# Patient Record
Sex: FEMALE | Race: ASIAN | NOT HISPANIC OR LATINO | Employment: OTHER | URBAN - METROPOLITAN AREA
[De-identification: names, ages, dates, MRNs, and addresses within clinical notes are randomized per-mention and may not be internally consistent; named-entity substitution may affect disease eponyms.]

---

## 2017-03-31 ENCOUNTER — OFFICE VISIT - HEALTHEAST (OUTPATIENT)
Dept: FAMILY MEDICINE | Facility: CLINIC | Age: 77
End: 2017-03-31

## 2017-03-31 ENCOUNTER — RECORDS - HEALTHEAST (OUTPATIENT)
Dept: GENERAL RADIOLOGY | Facility: CLINIC | Age: 77
End: 2017-03-31

## 2017-03-31 DIAGNOSIS — M54.9 DORSALGIA, UNSPECIFIED: ICD-10-CM

## 2017-03-31 DIAGNOSIS — I10 ESSENTIAL HYPERTENSION WITH GOAL BLOOD PRESSURE LESS THAN 140/90: ICD-10-CM

## 2017-03-31 DIAGNOSIS — M54.9 BACK PAIN: ICD-10-CM

## 2017-03-31 DIAGNOSIS — Z00.00 WELL ADULT EXAM: ICD-10-CM

## 2017-03-31 DIAGNOSIS — R51.9 HEADACHE: ICD-10-CM

## 2017-03-31 LAB
CHOLEST SERPL-MCNC: 248 MG/DL
FASTING STATUS PATIENT QL REPORTED: NO
HDLC SERPL-MCNC: 55 MG/DL
LDLC SERPL CALC-MCNC: 130 MG/DL
TRIGL SERPL-MCNC: 316 MG/DL

## 2017-03-31 ASSESSMENT — MIFFLIN-ST. JEOR: SCORE: 1075.77

## 2017-04-07 ENCOUNTER — RECORDS - HEALTHEAST (OUTPATIENT)
Dept: BONE DENSITY | Facility: CLINIC | Age: 77
End: 2017-04-07

## 2017-04-07 ENCOUNTER — RECORDS - HEALTHEAST (OUTPATIENT)
Dept: ADMINISTRATIVE | Facility: OTHER | Age: 77
End: 2017-04-07

## 2017-04-07 DIAGNOSIS — Z00.00 ENCOUNTER FOR GENERAL ADULT MEDICAL EXAMINATION WITHOUT ABNORMAL FINDINGS: ICD-10-CM

## 2017-04-24 ENCOUNTER — COMMUNICATION - HEALTHEAST (OUTPATIENT)
Dept: FAMILY MEDICINE | Facility: CLINIC | Age: 77
End: 2017-04-24

## 2017-04-28 ENCOUNTER — COMMUNICATION - HEALTHEAST (OUTPATIENT)
Dept: FAMILY MEDICINE | Facility: CLINIC | Age: 77
End: 2017-04-28

## 2017-06-08 ENCOUNTER — COMMUNICATION - HEALTHEAST (OUTPATIENT)
Dept: FAMILY MEDICINE | Facility: CLINIC | Age: 77
End: 2017-06-08

## 2017-06-08 DIAGNOSIS — I10 BENIGN HYPERTENSION: ICD-10-CM

## 2017-06-08 DIAGNOSIS — E78.5 HYPERLIPIDEMIA: ICD-10-CM

## 2017-06-28 ENCOUNTER — COMMUNICATION - HEALTHEAST (OUTPATIENT)
Dept: FAMILY MEDICINE | Facility: CLINIC | Age: 77
End: 2017-06-28

## 2017-06-28 DIAGNOSIS — I10 ESSENTIAL HYPERTENSION: ICD-10-CM

## 2017-08-29 ENCOUNTER — OFFICE VISIT - HEALTHEAST (OUTPATIENT)
Dept: FAMILY MEDICINE | Facility: CLINIC | Age: 77
End: 2017-08-29

## 2017-08-29 DIAGNOSIS — E78.5 HYPERLIPIDEMIA: ICD-10-CM

## 2017-08-29 DIAGNOSIS — I10 ESSENTIAL HYPERTENSION WITH GOAL BLOOD PRESSURE LESS THAN 140/90: ICD-10-CM

## 2017-08-29 DIAGNOSIS — M85.80 LOW BONE MASS: ICD-10-CM

## 2017-08-29 DIAGNOSIS — Z12.11 SCREEN FOR COLON CANCER: ICD-10-CM

## 2017-09-08 ENCOUNTER — AMBULATORY - HEALTHEAST (OUTPATIENT)
Dept: LAB | Facility: CLINIC | Age: 77
End: 2017-09-08

## 2017-09-08 ENCOUNTER — COMMUNICATION - HEALTHEAST (OUTPATIENT)
Dept: FAMILY MEDICINE | Facility: CLINIC | Age: 77
End: 2017-09-08

## 2017-09-08 DIAGNOSIS — Z12.11 SCREEN FOR COLON CANCER: ICD-10-CM

## 2017-11-29 ENCOUNTER — COMMUNICATION - HEALTHEAST (OUTPATIENT)
Dept: FAMILY MEDICINE | Facility: CLINIC | Age: 77
End: 2017-11-29

## 2017-11-29 DIAGNOSIS — I10 ESSENTIAL HYPERTENSION WITH GOAL BLOOD PRESSURE LESS THAN 140/90: ICD-10-CM

## 2018-03-30 ENCOUNTER — COMMUNICATION - HEALTHEAST (OUTPATIENT)
Dept: FAMILY MEDICINE | Facility: CLINIC | Age: 78
End: 2018-03-30

## 2018-03-30 ENCOUNTER — OFFICE VISIT - HEALTHEAST (OUTPATIENT)
Dept: FAMILY MEDICINE | Facility: CLINIC | Age: 78
End: 2018-03-30

## 2018-03-30 DIAGNOSIS — E66.9 OBESITY: ICD-10-CM

## 2018-03-30 DIAGNOSIS — E78.5 HYPERLIPIDEMIA: ICD-10-CM

## 2018-03-30 DIAGNOSIS — I10 BENIGN HYPERTENSION: ICD-10-CM

## 2018-03-30 DIAGNOSIS — I10 ESSENTIAL HYPERTENSION: ICD-10-CM

## 2018-03-30 DIAGNOSIS — M54.9 BACKACHE: ICD-10-CM

## 2018-03-30 LAB
ALBUMIN SERPL-MCNC: 3.5 G/DL (ref 3.5–5)
ALP SERPL-CCNC: 93 U/L (ref 45–120)
ALT SERPL W P-5'-P-CCNC: 30 U/L (ref 0–45)
ANION GAP SERPL CALCULATED.3IONS-SCNC: 11 MMOL/L (ref 5–18)
AST SERPL W P-5'-P-CCNC: 15 U/L (ref 0–40)
BILIRUB SERPL-MCNC: 0.6 MG/DL (ref 0–1)
BUN SERPL-MCNC: 22 MG/DL (ref 8–28)
CALCIUM SERPL-MCNC: 9.4 MG/DL (ref 8.5–10.5)
CHLORIDE BLD-SCNC: 106 MMOL/L (ref 98–107)
CO2 SERPL-SCNC: 26 MMOL/L (ref 22–31)
CREAT SERPL-MCNC: 0.66 MG/DL (ref 0.6–1.1)
GFR SERPL CREATININE-BSD FRML MDRD: >60 ML/MIN/1.73M2
GLUCOSE BLD-MCNC: 66 MG/DL (ref 70–125)
LDLC SERPL CALC-MCNC: 101 MG/DL
POTASSIUM BLD-SCNC: 3.9 MMOL/L (ref 3.5–5)
PROT SERPL-MCNC: 6.6 G/DL (ref 6–8)
SODIUM SERPL-SCNC: 143 MMOL/L (ref 136–145)

## 2018-03-30 ASSESSMENT — MIFFLIN-ST. JEOR: SCORE: 1106.38

## 2018-04-12 ENCOUNTER — COMMUNICATION - HEALTHEAST (OUTPATIENT)
Dept: FAMILY MEDICINE | Facility: CLINIC | Age: 78
End: 2018-04-12

## 2018-04-12 DIAGNOSIS — I10 ESSENTIAL HYPERTENSION: ICD-10-CM

## 2018-04-13 ENCOUNTER — OFFICE VISIT - HEALTHEAST (OUTPATIENT)
Dept: FAMILY MEDICINE | Facility: CLINIC | Age: 78
End: 2018-04-13

## 2018-04-13 DIAGNOSIS — M79.2 NEUROPATHIC PAIN: ICD-10-CM

## 2018-04-13 DIAGNOSIS — M25.659 DECREASED RANGE OF MOTION OF HIP: ICD-10-CM

## 2018-04-13 DIAGNOSIS — M85.80 LOW BONE MASS: ICD-10-CM

## 2018-04-13 DIAGNOSIS — M54.9 BACK PAIN: ICD-10-CM

## 2018-04-13 DIAGNOSIS — Z11.1 SCREENING FOR TUBERCULOSIS: ICD-10-CM

## 2018-04-13 LAB — TSH SERPL DL<=0.005 MIU/L-ACNC: 1.46 UIU/ML (ref 0.3–5)

## 2018-04-16 ENCOUNTER — COMMUNICATION - HEALTHEAST (OUTPATIENT)
Dept: FAMILY MEDICINE | Facility: CLINIC | Age: 78
End: 2018-04-16

## 2018-04-16 LAB
QTF INTERPRETATION: NORMAL
QTF MITOGEN - NIL: >10 IU/ML
QTF NIL: 0.08 IU/ML
QTF RESULT: NEGATIVE
QTF TB ANTIGEN - NIL: 0 IU/ML

## 2018-04-17 LAB — VIT B12 SERPL-MCNC: 476 PG/ML (ref 213–816)

## 2018-04-23 ENCOUNTER — HOSPITAL ENCOUNTER (OUTPATIENT)
Dept: PHYSICAL MEDICINE AND REHAB | Facility: CLINIC | Age: 78
Discharge: HOME | End: 2018-04-23
Attending: NURSE PRACTITIONER

## 2018-04-23 DIAGNOSIS — M54.42 CHRONIC BILATERAL LOW BACK PAIN WITH BILATERAL SCIATICA: ICD-10-CM

## 2018-04-23 DIAGNOSIS — M43.16 SPONDYLOLISTHESIS AT L4-L5 LEVEL: ICD-10-CM

## 2018-04-23 DIAGNOSIS — G89.29 CHRONIC BILATERAL LOW BACK PAIN WITH BILATERAL SCIATICA: ICD-10-CM

## 2018-04-23 DIAGNOSIS — M54.41 CHRONIC BILATERAL LOW BACK PAIN WITH BILATERAL SCIATICA: ICD-10-CM

## 2018-05-01 ENCOUNTER — COMMUNICATION - HEALTHEAST (OUTPATIENT)
Dept: FAMILY MEDICINE | Facility: CLINIC | Age: 78
End: 2018-05-01

## 2018-05-07 ENCOUNTER — OFFICE VISIT - HEALTHEAST (OUTPATIENT)
Dept: PHYSICAL THERAPY | Facility: REHABILITATION | Age: 78
End: 2018-05-07

## 2018-05-07 DIAGNOSIS — M54.41 CHRONIC BILATERAL LOW BACK PAIN WITH BILATERAL SCIATICA: ICD-10-CM

## 2018-05-07 DIAGNOSIS — M62.81 GENERALIZED MUSCLE WEAKNESS: ICD-10-CM

## 2018-05-07 DIAGNOSIS — M43.16 SPONDYLOLISTHESIS, LUMBAR REGION: ICD-10-CM

## 2018-05-07 DIAGNOSIS — M54.42 CHRONIC BILATERAL LOW BACK PAIN WITH BILATERAL SCIATICA: ICD-10-CM

## 2018-05-07 DIAGNOSIS — G89.29 CHRONIC BILATERAL LOW BACK PAIN WITH BILATERAL SCIATICA: ICD-10-CM

## 2018-05-08 ENCOUNTER — RECORDS - HEALTHEAST (OUTPATIENT)
Dept: ADMINISTRATIVE | Facility: OTHER | Age: 78
End: 2018-05-08

## 2018-05-16 ENCOUNTER — OFFICE VISIT - HEALTHEAST (OUTPATIENT)
Dept: PHYSICAL THERAPY | Facility: REHABILITATION | Age: 78
End: 2018-05-16

## 2018-05-16 DIAGNOSIS — M54.42 CHRONIC BILATERAL LOW BACK PAIN WITH BILATERAL SCIATICA: ICD-10-CM

## 2018-05-16 DIAGNOSIS — M54.41 CHRONIC BILATERAL LOW BACK PAIN WITH BILATERAL SCIATICA: ICD-10-CM

## 2018-05-16 DIAGNOSIS — M43.16 SPONDYLOLISTHESIS, LUMBAR REGION: ICD-10-CM

## 2018-05-16 DIAGNOSIS — M62.81 GENERALIZED MUSCLE WEAKNESS: ICD-10-CM

## 2018-05-16 DIAGNOSIS — G89.29 CHRONIC BILATERAL LOW BACK PAIN WITH BILATERAL SCIATICA: ICD-10-CM

## 2018-05-23 ENCOUNTER — OFFICE VISIT - HEALTHEAST (OUTPATIENT)
Dept: PHYSICAL THERAPY | Facility: REHABILITATION | Age: 78
End: 2018-05-23

## 2018-05-23 DIAGNOSIS — M54.41 CHRONIC BILATERAL LOW BACK PAIN WITH BILATERAL SCIATICA: ICD-10-CM

## 2018-05-23 DIAGNOSIS — M43.16 SPONDYLOLISTHESIS, LUMBAR REGION: ICD-10-CM

## 2018-05-23 DIAGNOSIS — M62.81 GENERALIZED MUSCLE WEAKNESS: ICD-10-CM

## 2018-05-23 DIAGNOSIS — G89.29 CHRONIC BILATERAL LOW BACK PAIN WITH BILATERAL SCIATICA: ICD-10-CM

## 2018-05-23 DIAGNOSIS — M54.42 CHRONIC BILATERAL LOW BACK PAIN WITH BILATERAL SCIATICA: ICD-10-CM

## 2018-09-14 ENCOUNTER — COMMUNICATION - HEALTHEAST (OUTPATIENT)
Dept: FAMILY MEDICINE | Facility: CLINIC | Age: 78
End: 2018-09-14

## 2018-09-14 DIAGNOSIS — I10 ESSENTIAL HYPERTENSION WITH GOAL BLOOD PRESSURE LESS THAN 140/90: ICD-10-CM

## 2018-09-14 DIAGNOSIS — E78.5 HYPERLIPIDEMIA: ICD-10-CM

## 2019-06-03 ENCOUNTER — COMMUNICATION - HEALTHEAST (OUTPATIENT)
Dept: FAMILY MEDICINE | Facility: CLINIC | Age: 79
End: 2019-06-03

## 2019-06-06 ENCOUNTER — RECORDS - HEALTHEAST (OUTPATIENT)
Dept: ADMINISTRATIVE | Facility: OTHER | Age: 79
End: 2019-06-06

## 2019-06-06 ENCOUNTER — AMBULATORY - HEALTHEAST (OUTPATIENT)
Dept: SCHEDULING | Facility: CLINIC | Age: 79
End: 2019-06-06

## 2019-06-06 ENCOUNTER — OFFICE VISIT - HEALTHEAST (OUTPATIENT)
Dept: FAMILY MEDICINE | Facility: CLINIC | Age: 79
End: 2019-06-06

## 2019-06-06 DIAGNOSIS — E78.5 HYPERLIPIDEMIA, UNSPECIFIED HYPERLIPIDEMIA TYPE: ICD-10-CM

## 2019-06-06 DIAGNOSIS — M54.9 BACKACHE: ICD-10-CM

## 2019-06-06 DIAGNOSIS — R20.9 DISTURBANCE OF SKIN SENSATION: ICD-10-CM

## 2019-06-06 DIAGNOSIS — I10 ESSENTIAL HYPERTENSION: ICD-10-CM

## 2019-06-06 DIAGNOSIS — Z12.11 SCREENING FOR COLON CANCER: ICD-10-CM

## 2019-06-06 DIAGNOSIS — M54.5 LOW BACK PAIN, UNSPECIFIED BACK PAIN LATERALITY, UNSPECIFIED CHRONICITY, WITH SCIATICA PRESENCE UNSPECIFIED: ICD-10-CM

## 2019-06-06 LAB
ALT SERPL W P-5'-P-CCNC: 17 U/L (ref 0–45)
ANION GAP SERPL CALCULATED.3IONS-SCNC: 9 MMOL/L (ref 5–18)
BUN SERPL-MCNC: 10 MG/DL (ref 8–28)
CALCIUM SERPL-MCNC: 9.9 MG/DL (ref 8.5–10.5)
CHLORIDE BLD-SCNC: 105 MMOL/L (ref 98–107)
CHOLEST SERPL-MCNC: 227 MG/DL
CO2 SERPL-SCNC: 27 MMOL/L (ref 22–31)
CREAT SERPL-MCNC: 0.67 MG/DL (ref 0.6–1.1)
FASTING STATUS PATIENT QL REPORTED: YES
FOLATE SERPL-MCNC: 14.3 NG/ML
GFR SERPL CREATININE-BSD FRML MDRD: >60 ML/MIN/1.73M2
GLUCOSE BLD-MCNC: 85 MG/DL (ref 70–125)
HDLC SERPL-MCNC: 59 MG/DL
LDLC SERPL CALC-MCNC: 144 MG/DL
POTASSIUM BLD-SCNC: 4.2 MMOL/L (ref 3.5–5)
SODIUM SERPL-SCNC: 141 MMOL/L (ref 136–145)
TRIGL SERPL-MCNC: 118 MG/DL
TSH SERPL DL<=0.005 MIU/L-ACNC: 1.6 UIU/ML (ref 0.3–5)
VIT B12 SERPL-MCNC: >2000 PG/ML (ref 213–816)

## 2019-06-06 ASSESSMENT — MIFFLIN-ST. JEOR: SCORE: 1107.52

## 2019-06-10 ENCOUNTER — AMBULATORY - HEALTHEAST (OUTPATIENT)
Dept: FAMILY MEDICINE | Facility: CLINIC | Age: 79
End: 2019-06-10

## 2019-06-10 DIAGNOSIS — E78.5 HYPERLIPIDEMIA, UNSPECIFIED HYPERLIPIDEMIA TYPE: ICD-10-CM

## 2019-06-18 ENCOUNTER — RECORDS - HEALTHEAST (OUTPATIENT)
Dept: ADMINISTRATIVE | Facility: OTHER | Age: 79
End: 2019-06-18

## 2019-06-18 LAB — COLOGUARD-ABSTRACT: NEGATIVE

## 2019-06-24 ENCOUNTER — AMBULATORY - HEALTHEAST (OUTPATIENT)
Dept: NURSING | Facility: CLINIC | Age: 79
End: 2019-06-24

## 2019-06-24 ENCOUNTER — COMMUNICATION - HEALTHEAST (OUTPATIENT)
Dept: FAMILY MEDICINE | Facility: CLINIC | Age: 79
End: 2019-06-24

## 2019-06-24 DIAGNOSIS — I10 ESSENTIAL HYPERTENSION: ICD-10-CM

## 2019-07-01 ENCOUNTER — OFFICE VISIT - HEALTHEAST (OUTPATIENT)
Dept: FAMILY MEDICINE | Facility: CLINIC | Age: 79
End: 2019-07-01

## 2019-07-01 DIAGNOSIS — M85.80 LOW BONE MASS: ICD-10-CM

## 2019-07-01 DIAGNOSIS — R20.9 DISTURBANCE OF SKIN SENSATION: ICD-10-CM

## 2019-07-01 ASSESSMENT — MIFFLIN-ST. JEOR: SCORE: 1090.51

## 2019-07-02 ENCOUNTER — COMMUNICATION - HEALTHEAST (OUTPATIENT)
Dept: FAMILY MEDICINE | Facility: CLINIC | Age: 79
End: 2019-07-02

## 2019-07-10 ENCOUNTER — RECORDS - HEALTHEAST (OUTPATIENT)
Dept: HEALTH INFORMATION MANAGEMENT | Facility: CLINIC | Age: 79
End: 2019-07-10

## 2019-07-17 ENCOUNTER — COMMUNICATION - HEALTHEAST (OUTPATIENT)
Dept: GERIATRIC MEDICINE | Facility: CLINIC | Age: 79
End: 2019-07-17

## 2019-08-08 ENCOUNTER — COMMUNICATION - HEALTHEAST (OUTPATIENT)
Dept: GERIATRIC MEDICINE | Facility: CLINIC | Age: 79
End: 2019-08-08

## 2019-08-22 ENCOUNTER — OFFICE VISIT - HEALTHEAST (OUTPATIENT)
Dept: FAMILY MEDICINE | Facility: CLINIC | Age: 79
End: 2019-08-22

## 2019-08-22 DIAGNOSIS — M54.50 MIDLINE LOW BACK PAIN WITHOUT SCIATICA, UNSPECIFIED CHRONICITY: ICD-10-CM

## 2019-08-22 DIAGNOSIS — I10 ESSENTIAL HYPERTENSION: ICD-10-CM

## 2019-08-22 DIAGNOSIS — M54.9 BACKACHE: ICD-10-CM

## 2019-08-22 DIAGNOSIS — J20.9 ACUTE BRONCHITIS, UNSPECIFIED ORGANISM: ICD-10-CM

## 2019-08-22 DIAGNOSIS — E78.00 PURE HYPERCHOLESTEROLEMIA: ICD-10-CM

## 2019-09-24 ENCOUNTER — RECORDS - HEALTHEAST (OUTPATIENT)
Dept: MAMMOGRAPHY | Facility: CLINIC | Age: 79
End: 2019-09-24

## 2019-09-24 ENCOUNTER — OFFICE VISIT - HEALTHEAST (OUTPATIENT)
Dept: FAMILY MEDICINE | Facility: CLINIC | Age: 79
End: 2019-09-24

## 2019-09-24 DIAGNOSIS — Z00.00 ROUTINE GENERAL MEDICAL EXAMINATION AT A HEALTH CARE FACILITY: ICD-10-CM

## 2019-09-24 DIAGNOSIS — M54.50 CHRONIC BILATERAL LOW BACK PAIN WITHOUT SCIATICA: ICD-10-CM

## 2019-09-24 DIAGNOSIS — Z12.31 ENCOUNTER FOR SCREENING MAMMOGRAM FOR BREAST CANCER: ICD-10-CM

## 2019-09-24 DIAGNOSIS — G89.29 CHRONIC BILATERAL LOW BACK PAIN WITHOUT SCIATICA: ICD-10-CM

## 2019-09-24 DIAGNOSIS — Z12.31 ENCOUNTER FOR SCREENING MAMMOGRAM FOR MALIGNANT NEOPLASM OF BREAST: ICD-10-CM

## 2019-09-24 ASSESSMENT — MIFFLIN-ST. JEOR: SCORE: 1108.65

## 2019-09-25 ENCOUNTER — COMMUNICATION - HEALTHEAST (OUTPATIENT)
Dept: FAMILY MEDICINE | Facility: CLINIC | Age: 79
End: 2019-09-25

## 2019-09-27 ENCOUNTER — COMMUNICATION - HEALTHEAST (OUTPATIENT)
Dept: FAMILY MEDICINE | Facility: CLINIC | Age: 79
End: 2019-09-27

## 2019-09-27 LAB
GAMMA INTERFERON BACKGROUND BLD IA-ACNC: 0.03 IU/ML
M TB IFN-G BLD-IMP: NEGATIVE
MITOGEN IGNF BCKGRD COR BLD-ACNC: -0.01 IU/ML
MITOGEN IGNF BCKGRD COR BLD-ACNC: 0 IU/ML
QTF INTERPRETATION: NORMAL
QTF MITOGEN - NIL: >10 IU/ML

## 2019-10-24 ENCOUNTER — COMMUNICATION - HEALTHEAST (OUTPATIENT)
Dept: FAMILY MEDICINE | Facility: CLINIC | Age: 79
End: 2019-10-24

## 2019-11-13 ENCOUNTER — COMMUNICATION - HEALTHEAST (OUTPATIENT)
Dept: GERIATRIC MEDICINE | Facility: CLINIC | Age: 79
End: 2019-11-13

## 2019-12-02 ENCOUNTER — COMMUNICATION - HEALTHEAST (OUTPATIENT)
Dept: FAMILY MEDICINE | Facility: CLINIC | Age: 79
End: 2019-12-02

## 2019-12-02 DIAGNOSIS — I10 ESSENTIAL HYPERTENSION: ICD-10-CM

## 2020-02-20 ENCOUNTER — COMMUNICATION - HEALTHEAST (OUTPATIENT)
Dept: GERIATRIC MEDICINE | Facility: CLINIC | Age: 80
End: 2020-02-20

## 2020-04-28 ENCOUNTER — COMMUNICATION - HEALTHEAST (OUTPATIENT)
Dept: GERIATRIC MEDICINE | Facility: CLINIC | Age: 80
End: 2020-04-28

## 2020-06-15 ENCOUNTER — COMMUNICATION - HEALTHEAST (OUTPATIENT)
Dept: GERIATRIC MEDICINE | Facility: CLINIC | Age: 80
End: 2020-06-15

## 2020-06-18 ENCOUNTER — COMMUNICATION - HEALTHEAST (OUTPATIENT)
Dept: FAMILY MEDICINE | Facility: CLINIC | Age: 80
End: 2020-06-18

## 2020-06-18 DIAGNOSIS — E78.5 HYPERLIPIDEMIA, UNSPECIFIED HYPERLIPIDEMIA TYPE: ICD-10-CM

## 2020-06-18 RX ORDER — ATORVASTATIN CALCIUM 40 MG/1
TABLET, FILM COATED ORAL
Qty: 90 TABLET | Refills: 3 | Status: SHIPPED | OUTPATIENT
Start: 2020-06-18 | End: 2021-07-15

## 2020-06-19 ENCOUNTER — COMMUNICATION - HEALTHEAST (OUTPATIENT)
Dept: GERIATRIC MEDICINE | Facility: CLINIC | Age: 80
End: 2020-06-19

## 2020-12-22 ENCOUNTER — COMMUNICATION - HEALTHEAST (OUTPATIENT)
Dept: FAMILY MEDICINE | Facility: CLINIC | Age: 80
End: 2020-12-22

## 2020-12-22 DIAGNOSIS — I10 ESSENTIAL HYPERTENSION: ICD-10-CM

## 2020-12-29 ENCOUNTER — OFFICE VISIT - HEALTHEAST (OUTPATIENT)
Dept: FAMILY MEDICINE | Facility: CLINIC | Age: 80
End: 2020-12-29

## 2020-12-29 DIAGNOSIS — I10 ESSENTIAL HYPERTENSION: ICD-10-CM

## 2020-12-29 RX ORDER — BLOOD PRESSURE TEST KIT
KIT MISCELLANEOUS
Qty: 1 EACH | Refills: 0 | Status: SHIPPED | OUTPATIENT
Start: 2020-12-29 | End: 2022-11-30

## 2020-12-29 ASSESSMENT — PATIENT HEALTH QUESTIONNAIRE - PHQ9: SUM OF ALL RESPONSES TO PHQ QUESTIONS 1-9: 0

## 2021-01-05 ENCOUNTER — OFFICE VISIT - HEALTHEAST (OUTPATIENT)
Dept: FAMILY MEDICINE | Facility: CLINIC | Age: 81
End: 2021-01-05

## 2021-01-05 DIAGNOSIS — I10 ESSENTIAL HYPERTENSION: ICD-10-CM

## 2021-01-05 DIAGNOSIS — Z11.59 NEED FOR HEPATITIS C SCREENING TEST: ICD-10-CM

## 2021-01-05 DIAGNOSIS — M85.80 LOW BONE MASS: ICD-10-CM

## 2021-01-05 DIAGNOSIS — E78.5 DYSLIPIDEMIA: ICD-10-CM

## 2021-01-05 DIAGNOSIS — R20.9 DISTURBANCE OF SKIN SENSATION: ICD-10-CM

## 2021-01-05 DIAGNOSIS — M54.50 CHRONIC BILATERAL LOW BACK PAIN WITHOUT SCIATICA: ICD-10-CM

## 2021-01-05 DIAGNOSIS — G89.29 CHRONIC BILATERAL LOW BACK PAIN WITHOUT SCIATICA: ICD-10-CM

## 2021-01-05 LAB
ALT SERPL W P-5'-P-CCNC: 28 U/L (ref 0–45)
ANION GAP SERPL CALCULATED.3IONS-SCNC: 13 MMOL/L (ref 5–18)
BUN SERPL-MCNC: 14 MG/DL (ref 8–28)
CALCIUM SERPL-MCNC: 9.6 MG/DL (ref 8.5–10.5)
CHLORIDE BLD-SCNC: 103 MMOL/L (ref 98–107)
CHOLEST SERPL-MCNC: 209 MG/DL
CO2 SERPL-SCNC: 25 MMOL/L (ref 22–31)
CREAT SERPL-MCNC: 0.78 MG/DL (ref 0.6–1.1)
FASTING STATUS PATIENT QL REPORTED: YES
GFR SERPL CREATININE-BSD FRML MDRD: >60 ML/MIN/1.73M2
GLUCOSE BLD-MCNC: 85 MG/DL (ref 70–125)
HDLC SERPL-MCNC: 70 MG/DL
LDLC SERPL CALC-MCNC: 110 MG/DL
POTASSIUM BLD-SCNC: 4.4 MMOL/L (ref 3.5–5)
SODIUM SERPL-SCNC: 141 MMOL/L (ref 136–145)
TRIGL SERPL-MCNC: 143 MG/DL
TSH SERPL DL<=0.005 MIU/L-ACNC: 2.54 UIU/ML (ref 0.3–5)
VIT B12 SERPL-MCNC: 526 PG/ML (ref 213–816)

## 2021-01-05 RX ORDER — HYDROCHLOROTHIAZIDE 25 MG/1
25 TABLET ORAL DAILY
Qty: 90 TABLET | Refills: 3 | Status: SHIPPED | OUTPATIENT
Start: 2021-01-05 | End: 2021-10-19

## 2021-01-05 ASSESSMENT — MIFFLIN-ST. JEOR: SCORE: 1118.86

## 2021-01-06 LAB — HCV AB SERPL QL IA: NEGATIVE

## 2021-05-26 ENCOUNTER — COMMUNICATION - HEALTHEAST (OUTPATIENT)
Dept: GERIATRIC MEDICINE | Facility: CLINIC | Age: 81
End: 2021-05-26

## 2021-05-26 ASSESSMENT — PATIENT HEALTH QUESTIONNAIRE - PHQ9: SUM OF ALL RESPONSES TO PHQ QUESTIONS 1-9: 0

## 2021-05-29 NOTE — TELEPHONE ENCOUNTER
Patient was just seen on CSS schedule today at 245 for a BP check. Her BP today is 122/82. Patient is scheduled on 7/1 @ 9:15am for an office visit with Dr. Gonzalez. Completing task.

## 2021-05-29 NOTE — TELEPHONE ENCOUNTER
[facility] pt, stated that she hasn't been seen by Dr. Gonzalez and will need a visit before he can sign paper forms. Renetta(worker) she stated that she will call back and make appt for pt.

## 2021-05-29 NOTE — TELEPHONE ENCOUNTER
----- Message from Nilson Reeves,  sent at 6/24/2019  2:49 PM CDT -----  Regarding: med  Pt called stating that the back pain med is not helping.  Pt would like for PCP to prescribe new pain med for back.  Please advise.

## 2021-05-29 NOTE — PROGRESS NOTES
"Subjective:  78 y.o. female with many concerns.    Has concerns about edema.  Trace on exam.  Discussed amlodipine could contribute.  Has not taken BP meds today or yesterday, so pressure is high, today.    Itching sensation lower extremities as well.  Nothing seems to make worse or better.  The same throughout the day and night.  Requests IV fluid to help with itching.  Has been present for a while--evaluated at spine clinic (also had pain)  PT and med employed that didn't help much for either.  Has not had imaging.  Previous B12 and TSH ok.    Says she gets rash on LEs as well.  None present today.    Outpatient Medications Prior to Visit   Medication Sig Dispense Refill     atorvastatin (LIPITOR) 20 MG tablet TAKE 1 PILL (20 MG TOTAL) BY MOUTH EVERY DAY/TXHUA HNUB NOJ 1 [facility]UB [facility] ROJ 90 tablet 2     blood pressure monitor Kit Electronic onitor blood pressure as needed. 1 each 0     calcium carbonate-vitamin D3 (CALCIUM 600 WITH VITAMIN D3) 600 mg(1,500mg) -500 unit cap Take 1 capsule by mouth 2 (two) times a day. 180 capsule 0     lisinopril (PRINIVIL,ZESTRIL) 20 MG tablet Take 1 tablet (20 mg total) by mouth daily. 90 tablet 1     VOLTAREN 1 % Gel        acetaminophen 500 mg coapsule Two capsules three times a day as needed for pain 500 capsule 1     amLODIPine (NORVASC) 5 MG tablet TAKE 1 PILL BY MOUTH DAILY FOR BLOOD PRESSURE / TXHUA HNUB NOJ 1 LUB [facility] AMANDA NTSHAV SIAB 90 tablet 2     pregabalin (LYRICA) 50 MG capsule Take 1 capsule (50 mg total) by mouth 3 (three) times a day. 90 capsule 2     No facility-administered medications prior to visit.       Social History     Tobacco Use   Smoking Status Never Smoker   Smokeless Tobacco Never Used   Tobacco Comment    no second hand smoke exposure      Objective:  /76 (Patient Site: Right Arm, Patient Position: Sitting, Cuff Size: Adult Regular)   Pulse 78   Resp 12   Ht 4' 10.5\" (1.486 m)   Wt 162 lb (73.5 kg)   BMI 33.28 " kg/m    GENERAL: alert, not distressed  CHEST: clear, no rales, rhonchi, or wheezes  CARDIAC: regular without murmur, gallop, or rub  ABDOMEN: soft, non tender, non distended, normal bowel sounds  MS: trace edema LEs.  No calf tenderness.    Assessment and Plan:   Confusing constellation of LE complains.  Stop amlodipine and start HCTZ recheck BP in 2 weeks.    1. Tingling (Paresthesia)  Perhaps neurontin would help if not better with resolution of edema  - Folate, Serum  - Thyroid Stimulating Hormone (TSH)  - Vitamin B12    2. Hyperlipidemia, unspecified hyperlipidemia type  - Lipid Cascade FASTING  - Basic Metabolic Panel  - ALT (SGPT)    3. Essential hypertension  Not controlled.  Med changes.  - hydroCHLOROthiazide (HYDRODIURIL) 25 MG tablet; Take 1 tablet (25 mg total) by mouth daily.  Dispense: 90 tablet; Refill: 1    4. Low back pain, unspecified back pain laterality, unspecified chronicity, with sciatica presence unspecified  Trial of COX2 for relief of pain.  - celecoxib (CELEBREX) 200 MG capsule; Take 1 capsule (200 mg total) by mouth 2 (two) times a day as needed for pain.  Dispense: 30 capsule; Refill: 0  - Tub Transfer Bench    5. Backache  Consider imaging--although had some hand symptoms as well.  - acetaminophen 500 mg coapsule; Two capsules three times a day as needed for pain  Dispense: 500 capsule; Refill: 1    6. Screening for colon cancer  - Cologuard   other health maintenance  - DXA Bone Density Scan; Future

## 2021-05-30 VITALS — BODY MASS INDEX: 31.25 KG/M2 | HEIGHT: 59 IN | WEIGHT: 155 LBS

## 2021-05-30 NOTE — TELEPHONE ENCOUNTER
Language line used to contact patient. Id: [id number]. Left message for patient to return call.   Okay to relay negative cologuard results. #1

## 2021-05-30 NOTE — PROGRESS NOTES
Subjective:  78 y.o. female with concerns of follow-up on multiple issues.  She states that the swelling she has felt in her legs are better with the cessation of amlodipine and start of hydrochlorothiazide.  Her blood pressure remains well controlled however she does not have lisinopril here with the rest of her medications and does not think she is taking it.    We reviewed the results of the bone scan.  Discussed osteopenia and the recommendation for weightbearing exercise, calcium, and vitamin D intake at ideal levels.  I will send a prescription for that again.    She continues to have concerns about some burning pain in her legs.  Vitamin D, B12, TSH testing of all been normal.  We discussed potentially getting an MRI to evaluate as she is had some spondylolisthesis on x-ray and has this burning pain.  She denies fevers or chills, saddle anesthesia.  Does have radiating pain to both legs.  Has been taking acetaminophen and celebrex with somewhat disappointing results.    She reports that she sent the cologard test back last week.    Outpatient Medications Prior to Visit   Medication Sig Dispense Refill     acetaminophen 500 mg coapsule Two capsules three times a day as needed for pain 500 capsule 1     atorvastatin (LIPITOR) 40 MG tablet Take 1 tablet (40 mg total) by mouth at bedtime. 90 tablet 3     blood pressure monitor Kit Electronic onitor blood pressure as needed. 1 each 0     celecoxib (CELEBREX) 200 MG capsule Take 1 capsule (200 mg total) by mouth 2 (two) times a day as needed for pain. 30 capsule 0     hydroCHLOROthiazide (HYDRODIURIL) 25 MG tablet Take 1 tablet (25 mg total) by mouth daily. 90 tablet 1     lisinopril (PRINIVIL,ZESTRIL) 20 MG tablet Take 1 tablet (20 mg total) by mouth daily. 90 tablet 1     VOLTAREN 1 % Gel        calcium carbonate-vitamin D3 (CALCIUM 600 WITH VITAMIN D3) 600 mg(1,500mg) -500 unit cap Take 1 capsule by mouth 2 (two) times a day. 180 capsule 0     No  "facility-administered medications prior to visit.       Social History     Tobacco Use   Smoking Status Never Smoker   Smokeless Tobacco Never Used   Tobacco Comment    no second hand smoke exposure      Objective:  /70 (Patient Site: Left Arm, Patient Position: Sitting, Cuff Size: Adult Regular)   Pulse 72   Resp 12   Ht 4' 10\" (1.473 m)   Wt 160 lb (72.6 kg)   BMI 33.44 kg/m    GENERAL: alert, not distressed  CHEST: clear, no rales, rhonchi, or wheezes  CARDIAC: regular without murmur, gallop, or rub  ABDOMEN: soft, non tender, non distended, normal bowel sounds  MS: negative SLR.  Normal gait.    Assessment and Plan:   1. Low bone mass  Discussed as above  - calcium carbonate-vitamin D3 (CALCIUM 600 WITH VITAMIN D3) 600 mg(1,500mg) -500 unit cap; Take 1 capsule by mouth 2 (two) times a day.  Dispense: 180 capsule; Refill: 0    2. Tingling (Paresthesia)  She declined to have MRI at this time.  Might return if needed.  Discussed trial of gabapentin for nerve modulated pain.  - gabapentin (NEURONTIN) 100 MG capsule; Take 100 mg by mouth 3 (three) times a day.  Dispense: 30 capsule; Refill: 0     Return in about 6 months (around 1/1/2020) for Recheck.   "

## 2021-05-30 NOTE — PROGRESS NOTES
[facility] Care Coordination Contact  [facility] Home Visit Assessment     Home visit for Health Risk Assessment with Yudi Fragoso completed on 7/17/2019    Type of residence:: Apartment - handicap accessible  Current living arrangement:: I live alone     Assessment completed with:: Patient, Care Team Member    Current Care Plan  Member currently receiving the following home care services:     Member currently receiving the following community resources: Day Care, Transportation Services       Medication Review  Medication reconciliation completed in Epic: YES  Medication set-up & administration: Independent-does not set up  Self-administers medications  Medication Risk Assessment Medication (1 or more, place referral to MTM):  N/A: No risk factors identified  MTM Referral Placed: No: No risk factors idenified    Mental/Behavioral Health   Depression Screening: See PHQ assessment flowsheet.   Mental health DX:: No      Mental Health Diagnosis: No  Mental Health Services: None: No further intervention needed at this time.    Falls Assessment:   Fallen 2 or more times in the past year?: No   Any fall with injury in the past year?: No    ADL/IADL Dependencies:   Dependent ADLs:: Ambulation-cane, Bathing, Eating, Toileting  Dependent IADLs:: Laundry, Cooking, Cleaning, Transportation    [facility] Plan sponsored benefits: Shared information re: Silver Sneakers/gym memberships, ASA, Calcium +D.    PCA Assessment completed at visit: No    Elderly Waiver Eligibility: Yes - will continue on EW    Care Plan & Recommendations: Per POC.    See LTCC for detailed assessment information.    Follow-Up Plan: Member informed of future contact, plan to f/u with member with a 6 month telephone assessment.  Contact information shared with member and family, encouraged member to call with any questions or concerns at any time.    [place] care continuum providers: Please refer to Health Care Home on the [facility] Problem List  to view this patient's [facility] Care Plan Summary.    Bisi Perdomo RN  [facility]  [phone]

## 2021-05-31 VITALS — BODY MASS INDEX: 31.23 KG/M2 | WEIGHT: 152 LBS

## 2021-05-31 NOTE — PROGRESS NOTES
[facility] Care Coordination Contact    Received after visit chart from care coordinator.  Completed following tasks:    Mailed copy of care plan to client, Updated services in access and Submitted referrals/auths for ADC and ADC Transportation with [facility].     Provider Signature - No POC Shared:  Member indicates that they do not want their POC shared with any EW providers.     Grabiel Noriega  Care Management Specialist  [facility]  [phone]

## 2021-05-31 NOTE — PROGRESS NOTES
One week   Cough.  Pain to side.  A lot.  Can't stop.  Mucus  No blood.  Sticky.  No shortness of breath  No fever.    Frontal headache.  No ear pain.    bp and chol med    Hyperlipidemia on statin denies muscle pain  Hypertension denies chest pain or headache.    Leg pain better.   No longer on the gabapentin.  Took prn    Back pain.better  comes and goes    No radicular         OBJECTIVE:   Vitals:    08/22/19 1128   BP: 124/68   Pulse: 78   Resp: 12   Temp: 98.5  F (36.9  C)      Eyes: non icteric, noninflamed  Lungs: no resp distress  Rhonchi   No wheeze or rales  Heart: regular  Ankles: no edema  Muscles: nontender  Mental status: euthymic  Neuro: nonfocal    Body mass index is 33.65 kg/m .     ASSESSMENT/PLAN:    1. Acute bronchitis, unspecified organism  dextromethorphan-guaiFENesin (ROBITUSSIN-DM)  mg/5 mL liquid    azithromycin (ZITHROMAX) 250 MG tablet   2. Pure hypercholesterolemia     3. Essential hypertension     4. Midline low back pain without sciatica, unspecified chronicity     5. Backache  acetaminophen 500 mg coapsule     Anticipate resolution otherwise return.  Return sooner if symptoms worsen.  Chronic issues stable/ same treatment.  Hold statin when on macrolide

## 2021-06-01 VITALS — BODY MASS INDEX: 33.08 KG/M2 | WEIGHT: 161 LBS

## 2021-06-01 VITALS — HEIGHT: 59 IN | BODY MASS INDEX: 32.61 KG/M2 | WEIGHT: 161.75 LBS

## 2021-06-01 VITALS — WEIGHT: 158 LBS | BODY MASS INDEX: 32.46 KG/M2

## 2021-06-01 NOTE — TELEPHONE ENCOUNTER
----- Message from Temo Gonzalez MD sent at 9/24/2019  6:08 PM CDT -----  Call:  Good news.  Normal mammogram.

## 2021-06-01 NOTE — TELEPHONE ENCOUNTER
Called and spoke with patient. Message was given. No other questions. Patient understood.   Used  line to contact patient. : Candy ID: [id number].

## 2021-06-01 NOTE — PROGRESS NOTES
"  Assessment and Plan:     1. Chronic bilateral low back pain without sciatica  Order faxed to [facility] medical  - Tub Transfer Bench    2. Routine general medical examination at a health care facility  Wants to go to  center so, check for TB  - Pneumococcal conjugate vaccine 13-valent 6wks-17yrs; >50yrs  - Td, Preservative Free (green label)  - QTF-Mycobacterium tuberculosis by QuantiFERON-TB Gold Plus    3. Encounter for screening mammogram for breast cancer  - Mammo Screening Bilateral; Future    The patient's current medical problems were reviewed.    I have had an Advance Directives discussion with the patient.   Full code.  Does not want to be kept alive if no meaningful recover.  Values \"peaceful life.\"  Decision maker would be her son, Sierra Fragoso.    The following health maintenance schedule was reviewed with the patient and provided in printed form in the after visit summary:   Health Maintenance   Topic Date Due     ADVANCE CARE PLANNING  08/04/1958     ZOSTER VACCINES (1 of 2) 08/04/1991     MEDICARE ANNUAL WELLNESS VISIT  08/04/2006     PNEUMOCOCCAL IMMUNIZATION 65+ LOW/MEDIUM RISK (1 of 2 - PCV13) 08/04/2006     TD 18+ HE  04/08/2018     FECAL OCCULT BLOOD/FIT ANNUAL COLON CANCER SCREENING  09/08/2018     INFLUENZA VACCINE RULE BASED (1) 08/01/2019     FALL RISK ASSESSMENT  06/06/2020     DXA SCAN  06/17/2021        Subjective:   Chief Complaint: Yudi Fragoso is an 79 y.o. female here for a Welcome to Medicare visit.     HPI:    Want to go to   Got different shape of atorvastatin 40.  Wants to go back to the old one because the new one causes stomach upset.  Advised to talk with pharmacy.    Review of Systems:    Still with some back pain.  Has declined MRI and declined referral to PT, and continues to do so.  Would like shower chair.    Please see above.  The rest of the review of systems are negative for all systems.    Patient Care Team:  Temo Gonzalez MD as PCP - General (Family " Medicine)  Bisi Perdomo RN as Lead Care Coordinator (Primary Care - CC)  Temo Gonzalez MD as Assigned PCP     Patient Active Problem List   Diagnosis     Hyperlipidemia     Myalgia And Myositis     Tingling (Paresthesia)     Essential hypertension     Disturbance Of Gait     Urinary Incontinence     Chronic bilateral low back pain without sciatica     Headache     Low bone mass     Past Medical History:   Diagnosis Date     Hyperlipidemia       History reviewed. No pertinent surgical history.   Family History   Problem Relation Age of Onset     No Medical Problems Son       Social History     Socioeconomic History     Marital status: Single     Spouse name: Not on file     Number of children: Not on file     Years of education: Not on file     Highest education level: Not on file   Occupational History     Not on file   Social Needs     Financial resource strain: Not on file     Food insecurity:     Worry: Not on file     Inability: Not on file     Transportation needs:     Medical: Not on file     Non-medical: Not on file   Tobacco Use     Smoking status: Never Smoker     Smokeless tobacco: Never Used     Tobacco comment: no second hand smoke exposure   Substance and Sexual Activity     Alcohol use: Not on file     Drug use: Not on file     Sexual activity: Not on file   Lifestyle     Physical activity:     Days per week: Not on file     Minutes per session: Not on file     Stress: Not on file   Relationships     Social connections:     Talks on phone: Not on file     Gets together: Not on file     Attends Taoist service: Not on file     Active member of club or organization: Not on file     Attends meetings of clubs or organizations: Not on file     Relationship status: Not on file     Intimate partner violence:     Fear of current or ex partner: Not on file     Emotionally abused: Not on file     Physically abused: Not on file     Forced sexual activity: Not on file   Other Topics Concern     Not on file  "  Social History Narrative     Not on file       Current Outpatient Medications   Medication Sig Dispense Refill     acetaminophen 500 mg coapsule Take 1,000 mg by mouth every 6 (six) hours as needed for pain. Two capsules three times a day as needed for pain 500 capsule 1     atorvastatin (LIPITOR) 40 MG tablet Take 1 tablet (40 mg total) by mouth at bedtime. 90 tablet 3     blood pressure monitor Kit Electronic onitor blood pressure as needed. 1 each 0     calcium carbonate-vitamin D3 (CALCIUM 600 WITH VITAMIN D3) 600 mg(1,500mg) -500 unit cap Take 1 capsule by mouth 2 (two) times a day. 180 capsule 0     celecoxib (CELEBREX) 200 MG capsule Take 1 capsule (200 mg total) by mouth 2 (two) times a day as needed for pain. 30 capsule 0     gabapentin (NEURONTIN) 100 MG capsule Take 100 mg by mouth 3 (three) times a day. 30 capsule 0     hydroCHLOROthiazide (HYDRODIURIL) 25 MG tablet Take 1 tablet (25 mg total) by mouth daily. 90 tablet 1     VOLTAREN 1 % Gel        No current facility-administered medications for this visit.       Objective:   Vital Signs:   Visit Vitals  /70 (Patient Site: Left Arm, Patient Position: Sitting, Cuff Size: Adult Regular)   Pulse 80   Resp 16   Ht 4' 10\" (1.473 m)   Wt 164 lb (74.4 kg)   BMI 34.28 kg/m       VisionScreening:  Vision Screening Comments: Left glasses at home and unable to do the vision test.        Assessment Results 9/24/2019   Mini Cog Total Score 2   Some recent data might be hidden     A Mini Cog score of 0-2 suggests the possibility of dementia, score of 3-5 suggests no dementia    Identified Health Risks:     The patient was provided with appropriate referrals to address her memory problem.    "

## 2021-06-01 NOTE — TELEPHONE ENCOUNTER
----- Message from Temo Gonzalez MD sent at 9/27/2019  2:06 PM CDT -----  Call:  Tb test normal.  Your forms will be sent to the adult center.

## 2021-06-01 NOTE — PATIENT INSTRUCTIONS - HE
Patient Education   Personalized Prevention Plan  You are due for the preventive services outlined below.  Your care team is available to assist you in scheduling these services.  If you have already completed any of these items, please share that information with your care team to update in your medical record.  Health Maintenance   Topic Date Due     ADVANCE CARE PLANNING  08/04/1958     ZOSTER VACCINES (1 of 2) 08/04/1991     MEDICARE ANNUAL WELLNESS VISIT  08/04/2006     PNEUMOCOCCAL IMMUNIZATION 65+ LOW/MEDIUM RISK (1 of 2 - PCV13) 08/04/2006     TD 18+ HE  04/08/2018     FECAL OCCULT BLOOD/FIT ANNUAL COLON CANCER SCREENING  09/08/2018     INFLUENZA VACCINE RULE BASED (1) 08/01/2019     FALL RISK ASSESSMENT  06/06/2020     DXA SCAN  06/17/2021

## 2021-06-02 NOTE — TELEPHONE ENCOUNTER
Spoke with [facility] [phone]. They have been trying to get a hold of pt by phone without success. I informed [facility] to call pt with a [facility] . They also needed her weight and height which I provided them that information. Called and left message for pt to call back.

## 2021-06-02 NOTE — TELEPHONE ENCOUNTER
----- Message from Nilson Reeves,  sent at 10/23/2019  3:35 PM CDT -----  Regarding: shower bench  Pt called to following on shower bench.  Per pt, she has been waiting for some one to calls her to  a shower bench, but no one calls yet.  Please advise.

## 2021-06-02 NOTE — TELEPHONE ENCOUNTER
Informed pt's daughter to help call [facility] [phone] regarding this order.

## 2021-06-03 VITALS
DIASTOLIC BLOOD PRESSURE: 70 MMHG | SYSTOLIC BLOOD PRESSURE: 130 MMHG | RESPIRATION RATE: 16 BRPM | WEIGHT: 164 LBS | HEIGHT: 58 IN | HEART RATE: 80 BPM | BODY MASS INDEX: 34.43 KG/M2

## 2021-06-03 VITALS — BODY MASS INDEX: 33.65 KG/M2 | WEIGHT: 161 LBS

## 2021-06-03 VITALS — BODY MASS INDEX: 33.58 KG/M2 | HEIGHT: 58 IN | WEIGHT: 160 LBS

## 2021-06-03 VITALS — WEIGHT: 162 LBS | HEIGHT: 59 IN | BODY MASS INDEX: 32.66 KG/M2

## 2021-06-03 NOTE — TELEPHONE ENCOUNTER
Noted, pt seen Refill Approved    Rx renewed per Medication Renewal Policy. Medication was last renewed on 6/6/19.    Kary Lowe, [facility] Triage/Med Refill 12/2/2019     Requested Prescriptions   Pending Prescriptions Disp Refills     hydroCHLOROthiazide (HYDRODIURIL) 25 MG tablet [Pharmacy Med Name: HYDROCHLOROTHIAZIDE 25 MG T 25 TAB] 90 tablet 1     Sig: TAKE 1 TABLET (25 MG TOTAL) BY MOUTH DAILY/ TXHUA HNUB NOJ 1 [facility]UB [facility]       Diuretics/Combination Diuretics Refill Protocol  Passed - 12/2/2019 10:25 AM        Passed - Visit with PCP or prescribing provider visit in past 12 months     Last office visit with prescriber/PCP: 7/1/2019 Temo Gonzalez MD OR same dept: 8/22/2019 Gabino Arzate MD OR same specialty: 8/22/2019 Gabino Arzate MD  Last physical: 9/24/2019 Last MTM visit: Visit date not found   Next visit within 3 mo: Visit date not found  Next physical within 3 mo: Visit date not found  Prescriber OR PCP: Temo Gonzalez MD  Last diagnosis associated with med order: 1. Essential hypertension  - hydroCHLOROthiazide (HYDRODIURIL) 25 MG tablet [Pharmacy Med Name: HYDROCHLOROTHIAZIDE 25 MG T 25 TAB]; TAKE 1 TABLET (25 MG TOTAL) BY MOUTH DAILY/ TX[facility]UA [facility]NUB NOJ [facility] NTSHAV SIAB  Dispense: 90 tablet; Refill: 1    If protocol passes may refill for 12 months if within 3 months of last provider visit (or a total of 15 months).             Passed - Serum Potassium in past 12 months      Lab Results   Component Value Date    Potassium 4.2 06/06/2019             Passed - Serum Sodium in past 12 months      Lab Results   Component Value Date    Sodium 141 06/06/2019             Passed - Blood pressure on file in past 12 months     BP Readings from Last 1 Encounters:   09/24/19 130/70             Passed - Serum Creatinine in past 12 months      Creatinine   Date Value Ref Range Status   06/06/2019 0.67 0.60 - 1.10 mg/dL Final

## 2021-06-03 NOTE — PROGRESS NOTES
"[facility] Care Coordination Contact    Writer got a call from member asking why her insurance did not cover a vaccination.  Member didn't know what the vaccination was called but she mentioned \"chickenpox\" during conversation.  Writer is assuming that member was referring to the shingles vaccination.       Writer emailed and asked Grabiel Fragoso CMA at [facility] if there is a particular pharmacy that they'd recommend for member to go to for the shingles vaccination.    Bisi Perdomo RN  [facility]  [phone]     11-13-19:  Writer received response from [facility] [place] and was told that member can go to any pharmacy for the shingles vaccine.  Writer called member and left a VM stating what Grabiel told writer.      Bisi Perdomo RN  [facility]  [phone]     "

## 2021-06-05 VITALS
TEMPERATURE: 96.8 F | WEIGHT: 162.75 LBS | SYSTOLIC BLOOD PRESSURE: 130 MMHG | BODY MASS INDEX: 32.81 KG/M2 | HEART RATE: 78 BPM | HEIGHT: 59 IN | DIASTOLIC BLOOD PRESSURE: 80 MMHG

## 2021-06-06 NOTE — PROGRESS NOTES
[facility] Care Coordination Contact    2-:  Called member to complete six month assessment and left a message requesting a return call.    Bisi Perdomo RN  [facility]  [phone]     2-:  Called member to complete six month assessment and left a message requesting a return call.      [facility] Six-Month Telephone Assessment    6 month telephone assessment completed on 2-.    ER visits: No  Hospitalizations: No  TCU stays: No  Significant health status changes: no  Falls/Injuries: No  ADL/IADL changes: No  Changes in services: No    Caregiver Assessment follow up:  n/q    Goals: See POC in chart for goal progress documentation.       Will see member in 6 months for an annual health risk assessment.   Encouraged member to call CC with any questions or concerns in the meantime.    Writer asked member about MA renewal paperwork and member reports that she already sent in paperwork last month but was told by the [facility], yesterday, that they had more paperwork for her to fill out.  She completed the paperwork yesterday and the ADC sent the paperwork to the financial worker at [facility].    Bisi Perdomo RN  [facility]  [phone]        3-: Writer called member and asked about MA again.  Member informed writer that she has submitted all the necessary paperwork already but is still receiving letters stating there her MA will end on 3-.  Writer suggested member call [facility] financial worker as soon as able.  Member stated understanding and will have her daughter in law call.      Bisi Perdomo RN  [facility]  [phone]

## 2021-06-07 NOTE — PROGRESS NOTES
[facility] Care Coordination Contact    Writer received call from member's financial worker at [facility] (Ka [phone]) asking if member's EW has been closed by writer because [facility] closed member's MA since 1-1-2020.  Writer told Ka that writer has not closed member's EW.  Ka stated that since writer has not closed member's EW, member will not need a new assessment if member can provide a completed AVS form along with a checking and saving account balances for 1-1-2020 and 4-1-2020.  Ka mailed out the requested paperwork today.      Writer called member and left a voicemail asking for a return call.  Writer also called member's son, Sierra Fragoso ([phone]) and left a voicemail asking for him to have member call writer.  Writer to explain what Ka stated to writer.    Awaiting call from member.    Bisi Perdomo RN  [facility]  [phone]         4-: member returned call to writer.  Writer explained Ka's message above.  Member stated understanding and said that member's daugter in law talked to someone from [facility] the other day but member was unsure if it was Lucas.  Member also asked for Ka's contact info and said that she would call Ka for more details as well.      Bisi Perdomo RN  [facility]  [phone]       6-:  Writer received call from Bebo Alfonso ( Intake [phone]) asking for member's transfer documents.    Writer emailed secure: 1932, 7400, LTCC, POC, and POCsig to [email or web address] at Bebo's request.    Bisi Perdomo RN  [facility]  [phone]

## 2021-06-08 NOTE — PROGRESS NOTES
Member became effective with [employer] [facility]  Previous Health Plan: MA   Previous Care System: [facility]  Previous care coordinators name and number: Bisi Perdomo/[facility]  Waiver Type: N/A  Last MMIS Entry: Date 7/17/19 and Type 06.  MMIS visit date (and type) if different from above: n/a  Services Listed in MMIS: None  UTF received: No: Requested on 6/12/20 From [facility].     Grabiel Noriega  Care Management Specialist  [facility]  [phone]

## 2021-06-08 NOTE — PROGRESS NOTES
[facility] responded no UTF docs. CMS Requested through [facility], [facility] states members don t have EW or UTF docs to transfer.     Grabiel Noriega  Care Management Specialist  [facility]  [phone]

## 2021-06-09 NOTE — PROGRESS NOTES
"Per CC, mailed client an \"Unable to Contact\" letter.    Grabiel Noriega  Care Management Specialist  [facility]  [phone]    "

## 2021-06-09 NOTE — PROGRESS NOTES
[facility] Care Coordination Contact    Writer called member and left a voicemail asking for a return call.  Writer to do phone assessment with member once call is returned.      Bisi Perdomo RN  [facility]  [phone]       6-:  Writer called member and left a voicemail asking for a return call.  Writer to do phone assessment with member once call is returned.      Bisi Perdomo RN  [facility]  [phone]       6-:  Writer called member and left a voicemail asking for a return call.  Writer to do phone assessment with member once call is returned.      Unable to reach.    Bisi Perdomo RN  [facility]  [phone]

## 2021-06-09 NOTE — PROGRESS NOTES
No longer active with [facility] community case management effective 3/31/20. Reason for community disenrollment: EDVIN Rich.    Grabiel Noriega  Care Management Specialist  [facility]  [phone]

## 2021-06-09 NOTE — PROGRESS NOTES
"Adult Physical:    CC:  phsycial and other complaints.    HPI:  Has occasional headache and wants ibuprofen refilled for that.  Feels like muscle tension is present.  Does not feel like \"bone pain.  Has something every 2-3 days.    Back pain is been present and on and off for 1 year.  Located in the lower bilateral lumbar sacral area.  She denies radiation to her legs.  She reports legs feel \"hot\".  Particularly in the summer.  Feels better if she is wearing shorts.  She has used NSAIDs in the past but has not had any manipulative therapy or physical therapy.    Patient Active Problem List   Diagnosis     Hyperlipidemia     Myalgia And Myositis     Tingling (Paresthesia)     Essential hypertension     Disturbance Of Gait     Urinary Incontinence     Backache     Headache     Past Medical History:  History reviewed. No pertinent past medical history.    Past Surgical History:  History reviewed. No pertinent surgical history.    Medicines:   Yudi Fragoso   Home Medication Instructions DARRELL:    Printed on:03/31/17 0811   Medication Information                      acetaminophen (TYLENOL) 500 MG tablet  Take 1-2 pills up to 4 times a day prn for headaches.             blood pressure monitor Kit  Electronic onitor blood pressure as needed.             hydrochlorothiazide (MICROZIDE) 12.5 mg capsule  TAKE 1 CAPSULE (12.5 MG TOTAL) BY MOUTH DAILY/ TXHUA HNUB NOJ 1 LUB TSHUAJ PAB AMANDA NTSHAV SIAB             ibuprofen (ADVIL,MOTRIN) 400 MG tablet  Take 400 mg by mouth every 6 (six) hours as needed for pain.             lisinopril (PRINIVIL,ZESTRIL) 20 MG tablet  TAKE 1 PILL (20 MG TOTAL) BY MOUTH DAILY/ TXHUA HNUB NOJ 1 LUB TSHUAJ PAB AMANDA NTSHAV SIAB             simvastatin (ZOCOR) 20 MG tablet  Take 1 tablet (20 mg total) by mouth daily.             VOLTAREN 1 % Gel                 Allergies:  No Known Allergies    Family History:  Family History   Problem Relation Age of Onset     No Medical Problems Son      Social " "History:  Lives with son.  No tobacco or alcohol.    Review of Systems:  GEN: no fevers or chills   ENT: no sinus concerns, normal hearing and vision   RESP: no cough or wheezing   CV: no dyspnea, palpitations, edema or chest pain   GI: no nausea, vomiting, diarrhea, or constipation   : normal urination   ENDO: no hot or cold intolerance, no polydipsia or polyuria   MS: no myalgias or arthralgias   DERM: no rash or bruising   PSYCH: no depression concerns     Physical Exam:  /64 (Patient Site: Right Arm, Patient Position: Sitting, Cuff Size: Adult Regular)  Pulse 72  Temp 97.6  F (36.4  C) (Oral)   Ht 4' 10.5\" (1.486 m)  Wt 155 lb (70.3 kg)  Breastfeeding? No  BMI 31.84 kg/m2     Gen:   Alert, not distressed  Head:   Normocephalic, without obvious abnormality, atraumatic  Eyes:   PERRL, conjunctiva/corneas clear, EOM's intact  Ears:   Normal tympanic membranes and external ear canals  Nose:    Mucosa normal, no drainage or sinus tenderness  Throat:    No erythema or exudates  Neck:    No adenopathy no nodules in thyroid, normal ROM  Lungs:    Clear to auscultation bilaterally, respirations unlabored  Chest wall:  No tenderness or deformity  Heart:     Regular, normal S1 and S2, no murmur, gallop or rub  Abdomen:  Soft, non-tender, bowel sounds active all four quadrants, no masses, no organomegaly  Back:    Symmetric, no curvature, ROM normal, no CVA tenderness  Extremities: Extremities normal, atraumatic, no cyanosis or edema  Skin:   Skin color, texture, turgor normal, no rashes or lesions  Lymph nodes: Cervical, and supraclavicular, nodes normal  Neurologic: CNII-XII intact.   DTRs normal and symmetric.  Symmetric strength and sensation.  Straight leg raise test negative bilaterally.    Lumbar spine x-ray:  Personally reviewed.  Nothing to explain pain    Immunizations Due:  PCV:  PPSV23 ordered  FLU: patient decliens    Tobacco Cessation:   n/a    Cancer Screening:  Colon: FOBT done in " August  Breast: discussed.  Patient declined mammogram.    Weight management:   The patient was counseled regarding nutrition and physical activity    Assessment and Plan:  1. Back pain  She can continue judicious use of NSAIDs given her kidney function.  There is any decline we may need to revisit that.  - ibuprofen (ADVIL,MOTRIN) 400 MG tablet; Take 1 tablet (400 mg total) by mouth every 6 (six) hours as needed for pain.  Dispense: 30 tablet; Refill: 0    2. Well adult exam  - Pneumococcal polysaccharide vaccine 23-valent 1 yo or older, subq/IM  - DXA Bone Density Scan; Future    3. Headache  - ibuprofen (ADVIL,MOTRIN) 400 MG tablet; Take 1 tablet (400 mg total) by mouth every 6 (six) hours as needed for pain.  Dispense: 30 tablet; Refill: 0    4. Essential hypertension with goal blood pressure less than 140/90  - Basic Metabolic Panel  - Lipid Cascade RANDOM

## 2021-06-11 ENCOUNTER — COMMUNICATION - HEALTHEAST (OUTPATIENT)
Dept: GERIATRIC MEDICINE | Facility: CLINIC | Age: 81
End: 2021-06-11

## 2021-06-12 NOTE — PROGRESS NOTES
Subjective:  77 y.o. female with concerns of needing a form filled out for public housing.  She wants to live below the sixth floor and a public housing facility because of her fear of heights.  She states she gets care and cannot move if she is up that high.  She thinks a few stems from [place]  She also reports relates being fearful of fires and fearful of her ability to descend stairs quickly if needed due to a fire.  She states she would lose sleep worrying about this potential occurrence.  We discussed that did not seem to be a medical diagnosis that would fit with that condition but I could fill out her public housing form with the information she provided.  Ultimately, the decision will be with the public Housing Authority about whether or not her concerns qualified.    She continues to have headaches.  She estimates 2-3 times per week.  She states she will take some medication though she is not sure what it is.  She thinks her headaches will stay away for a few days after that.  They then recur.    Outpatient Medications Prior to Visit   Medication Sig Dispense Refill     acetaminophen (TYLENOL) 500 MG tablet Take 1-2 pills up to 4 times a day prn for headaches.       blood pressure monitor Kit Electronic onitor blood pressure as needed. 1 each 0     ibuprofen (ADVIL,MOTRIN) 400 MG tablet Take 1 tablet (400 mg total) by mouth every 6 (six) hours as needed for pain. 30 tablet 0     lisinopril (PRINIVIL,ZESTRIL) 20 MG tablet TAKE 1 PILL (20 MG TOTAL) BY MOUTH DAILY/ TXHUA HNUB NOJ 1 LUB TSHUAJ PAB AMANDA NTSHAV SIAB 90 tablet 1     VOLTAREN 1 % Gel        hydroCHLOROthiazide (MICROZIDE) 12.5 mg capsule TAKE 1 CAPSULE (12.5 MG TOTAL) BY MOUTH DAILY/ TXHUA HNUB NOJ 1 LUB TSHUAJ PAB AMANDA NTSHAV SIAB 90 capsule 2     simvastatin (ZOCOR) 20 MG tablet TAKE 1 PILL BY MOUTH EVERY DAY/ TXHUA HNUB NOJ 1 LUB TSHUAJ PAB AMANDA NTSHAV MUAJ ROJ 90 tablet 3     No facility-administered  medications prior to visit.       History   Smoking Status     Never Smoker   Smokeless Tobacco     Not on file     Comment: no second hand smoke exposure      Objective:  /70 (Patient Site: Right Arm, Patient Position: Sitting, Cuff Size: Adult Regular)  Pulse 92  Wt 152 lb (68.9 kg)  Breastfeeding? No  BMI 31.23 kg/m2  GENERAL: alert, not distressed  CHEST: clear, no rales, rhonchi, or wheezes  CARDIAC: regular without murmur    EYES: PERRL/EOMI, no scleral icterus, no conjunctival injection  Cranial Nerves:  II-normal visual fields  III, IV, VI-normal extraocular movements  V-normal facial sensation  VII-normal facial power  VIII-hearing normal at conversational level  IX, X-palate/uvula symmetric  XI-shoulder shrug antigravity  XII-tongue midline     Assessment and Plan:   1. Headaches  Recommended changing to a calcium channel blocker for blood pressure control.  Since her pressures fairly well controlled would recommend stopping the hydrochlorothiazide.  Due to interactions with simvastatin I would recommend that the cholesterol-lowering drug be changed to atorvastatin as well.  Continues to treat headaches with a as needed NSAID or acetaminophen.    2. Essential hypertension with goal blood pressure less than 140/90  Stop HCTZ.  Continue lisinopril  - amLODIPine (NORVASC) 5 MG tablet; Take 1 tablet (5 mg total) by mouth daily.  Dispense: 90 tablet; Refill: 0    3. Hyperlipidemia  Stop simvastatin.  - atorvastatin (LIPITOR) 20 MG tablet; Take 1 tablet (20 mg total) by mouth daily.  Dispense: 90 tablet; Refill: 3    4. Low bone mass  Discussed results of DXA scan.  - calcium carbonate-vitamin D3 (CALCIUM 600 WITH VITAMIN D3) 600 mg(1,500mg) -500 unit cap; Take 1 capsule by mouth 2 (two) times a day.  Dispense: 180 capsule; Refill: 0     5.Screen for colon cancer  - Occult Blood(ICT); Future    This visit was conducted with the aid of a professional .

## 2021-06-14 NOTE — PROGRESS NOTES
"Subjective:  80 y.o. female with concerns of follow up.  Needs bp recheck  Could not get meter for home  ? No insurance coverage.  Taking HCTZ no concerns.    Hx osteopenia.  Not taking Ca  DXA 2019 due 2021  Does exercises.    Feet burn, leg tingle.  Feel hot inside, but not tactile.  Not pain.  Nothing makes worse or better.  Over 7 years the same.  Mild LBP.  No fever or chills.  No hx CA  No new incontinence  No saddle numbness    Outpatient Medications Prior to Visit   Medication Sig Dispense Refill     atorvastatin (LIPITOR) 40 MG tablet TAKE 1 PILL BY MOUTH EVERY NIGHT AT BEDTIME FOR CHOLESTEROL / TXHUA HMO NOJ 1 LUB TSHUAJ THAUM MUS PW PAB NTSHAV MUAJ ROJ 90 tablet 3     hydroCHLOROthiazide (HYDRODIURIL) 25 MG tablet Take 1 tablet (25 mg total) by mouth daily. 30 tablet 0     blood pressure monitor Kit Disp 1 brachial BP meter with digital readout. 1 each 0     acetaminophen 500 mg coapsule Take 1,000 mg by mouth every 6 (six) hours as needed for pain. Two capsules three times a day as needed for pain 500 capsule 1     blood pressure monitor Kit Electronic onitor blood pressure as needed. 1 each 0     calcium carbonate-vitamin D3 (CALCIUM 600 WITH VITAMIN D3) 600 mg(1,500mg) -500 unit cap Take 1 capsule by mouth 2 (two) times a day. 180 capsule 0     No facility-administered medications prior to visit.       Social History     Tobacco Use   Smoking Status Never Smoker   Smokeless Tobacco Never Used   Tobacco Comment    no second hand smoke exposure      Objective:  /80 (Patient Site: Left Arm, Patient Position: Sitting, Cuff Size: Adult Regular)   Pulse 78   Temp 96.8  F (36  C) (Other)   Ht 4' 11\" (1.499 m)   Wt 162 lb 12 oz (73.8 kg)   BMI 32.87 kg/m    GENERAL: alert, not distressed  CHEST: clear, no rales, rhonchi, or wheezes  CARDIAC: regular without murmur, gallop, or rub  ABDOMEN: soft, non tender, non distended, normal bowel sounds  NEURO: SLR negative, normal gait, strength  MS: normal " muscle tone, bulk.    Assessment and Plan:   1. Essential hypertension  Controlled on third check here.  Check labs  Continue HCTZ  - Basic Metabolic Panel  - hydroCHLOROthiazide (HYDRODIURIL) 25 MG tablet; Take 1 tablet (25 mg total) by mouth daily.  Dispense: 90 tablet; Refill: 3    2. Tingling (Paresthesia)  Recheck for causes of peripheral neuropathy  - Thyroid Cascade  - Vitamin B12    3. Dyslipidemia  Needs monitoring.  - Lipid Cascade FASTING  - ALT (SGPT)    4. Need for hepatitis C screening test  - Hepatitis C Antibody (Anti-HCV)    5. Chronic bilateral low back pain without sciatica  Mild and stable with no warning signs.  PT considered but patient wants to avoid visits during pandemic.  Has a home exercise routine.    Encouraged to keep it up.  - acetaminophen 500 mg coapsule; Take 1,000 mg by mouth every 6 (six) hours as needed for pain. Two capsules three times a day as needed for pain  Dispense: 500 capsule; Refill: 1    7. Low bone mass  Continue exercise as she has been,,  Restart Ca and D supplement  - calcium carbonate-vitamin D3 (CALCIUM 600 WITH VITAMIN D3) 600 mg(1,500mg) -500 unit cap; Take 1 capsule by mouth 2 (two) times a day.  Dispense: 180 capsule; Refill: 0  - multivitamin (ONE A DAY) per tablet; Take 1 tablet by mouth daily.  Dispense: 120 tablet; Refill: 2

## 2021-06-14 NOTE — TELEPHONE ENCOUNTER
Patient has a future Telephone Visit appointment on 12/29/2020 with Dr. Gonzalez. Completing task.

## 2021-06-14 NOTE — PROGRESS NOTES
"Yudi Fragoso is a 80 y.o. female who is being evaluated via a billable telephone visit.      The patient has been notified of following:     \"This telephone visit will be conducted via a call between you and your physician/provider. We have found that certain health care needs can be provided without the need for a physical exam.  This service lets us provide the care you need with a short phone conversation.  If a prescription is necessary we can send it directly to your pharmacy.  If lab work is needed we can place an order for that and you can then stop by our lab to have the test done at a later time.    Telephone visits are billed at different rates depending on your insurance coverage. During this emergency period, for some insurers they may be billed the same as an in-person visit.  Please reach out to your insurance provider with any questions.    If during the course of the call the physician/provider feels a telephone visit is not appropriate, you will not be charged for this service.\"    Patient has given verbal consent to a Telephone visit? Yes    What phone number would you like to be contacted at? [phone]    Patient would like to receive their AVS by AVS Preference: Mail a copy.    Additional provider notes:   Wants to have F2F visit rather than virtual.  Does not have BP cuff at home  Needs labs.  Will call to schedule appt.  Long discussion about meds and blood pressure and how to get clinic appt.    Assessment/Plan:  1. Essential hypertension  - blood pressure monitor Kit; Disp 1 brachial BP meter with digital readout.  Dispense: 1 each; Refill: 0    Phone call duration:  16 minutes    Temo Gonzalez MD      "

## 2021-06-16 PROBLEM — M85.80 LOW BONE MASS: Status: ACTIVE | Noted: 2017-08-29

## 2021-06-17 NOTE — PROGRESS NOTES
ASSESSMENT: Yudi Fragoso is a 77 y.o. female who presents for consultation at the request of HE PCP Temo Gonzalez MD, with a past medical history significant for hyperlipidemia, myalgia and myositis, paresthesias, hypertension, urinary incontinence, low bone mass, back pain, headache who presents today for new patient evaluation of:    -Chronic progressive bilateral low back pain lumbosacral junction that radiates into the bilateral buttock as well as lateral thigh with some burning pain into the anterior shin ×2 years that is worse in the last 5 months.  No prior conservative treatment.  Lumbar spine x-ray 2017 reveals L4-5 spondylolisthesis and mild degenerative changes.    Patient is neurologically intact on exam. No myelopathic or red flag symptoms.     ASIM Score: 38    WHO-5: 25, patient not interested in behavioral health services.    PHQ-2: 0    Diagnoses and all orders for this visit:    Spondylolisthesis at L4-L5 level  -     Ambulatory referral to PT/OT  -     naproxen (EC NAPROSYN) 500 MG EC tablet; Take 1 tablet (500 mg total) by mouth 2 (two) times a day as needed.  Dispense: 28 tablet; Refill: 0    Chronic bilateral low back pain with bilateral sciatica  -     Ambulatory referral to PT/OT  -     naproxen (EC NAPROSYN) 500 MG EC tablet; Take 1 tablet (500 mg total) by mouth 2 (two) times a day as needed.  Dispense: 28 tablet; Refill: 0    PLAN:  Reviewed spine anatomy and disease process. Discussed diagnosis and treatment options with the patient today. A shared decision making model was used.  The patient's values and choices were respected. The following represents what was discussed and decided upon by the provider and the patient.      -DIAGNOSTIC TESTS:  Images were personally reviewed and interpreted and explained to patient today using spine model.   --Discussed with patient if she is not improving with physical therapy or not tolerating PT exercises would recommend lumbar spine MRI at that  time however as she is neurologically intact on exam we can trial conservative treatments first.  --Lumbar spine x-ray 3/31/2017 shows slight anterolisthesis L4-5 with facet arthropathy L4 to the sacrum. *S1 partially lumbarized.    -PHYSICAL THERAPY: Referral to physical therapy [facility] for core strengthening nerve glide exercises and establishing home exercise program.  Discussed the importance of core strengthening, ROM, stretching exercises with the patient and how each of these entities is important in decreasing pain.  Explained to the patient that the purpose of physical therapy is to teach the patient a home exercise program.  These exercises need to be performed every day in order to decrease pain and prevent future occurrences of pain.        -MEDICATIONS: Advised patient continue Lyrica and Tylenol prescribe a PCP.  -Prescribed naproxen 500 mg 1 tablet twice daily for pain and inflammation ×2 weeks, did discuss with patient this is not a medication I would recommend long-term however.  Discussed multiple medication options today with patient. Discussed risks, side effects, and proper use of medications. Patient verbalized understanding.    -INTERVENTIONS: Can consider injections down the road if pain is not improving with physical therapy pending MRI.  *Partially lumbarized S1 noted on x-ray 3/31/2017.  Discussed risks and benefits of injections with patient today.    -PATIENT EDUCATION:  45 minutes of total visit time was spent face to face with the patient today, greater than 50% of total time spent with patient was spent on counseling, education, and coordinating care.   -10 minutes spent outside of visit time, non-face-to-face time, reviewing chart.    -FOLLOW-UP:   Follow-up in 6 weeks, sooner pain is worsening or new symptoms arise.    Advised patient to call the [facility] if symptoms worsen or you have problems controlling bladder and bowel function.    ______________________________________________________________________    SUBJECTIVE:  HPI:  Yudi Fragoso  Is a 77 y.o. female who presents today for new patient evaluation of low back pain across the lumbosacral junction and beltline that is constant 4/10 ongoing for the last 2 years that is been worsening in the last 4-5 months with new pain into the lateral thighs and some burning pain into the anterior shin bilaterally that is worse with standing and walking and improved with sitting but does not go away with sitting.    Patient denies any tingling but does state some numbness into the anterior shins, denies weakness or recent trips or falls, denies balance changes, denies bowel or bladder dysfunction.    -Treatment to Date: No prior spinal surgery or spinal injection.  No prior physical therapy.  Chiropractic and acupuncture treatment in the past with no benefit.    -Medications:  Over-the-counter Tylenol with some benefit.  Lyrica 50 mg 1 tablet 3 times daily prescribed by PCP with minimal benefit.    Current Outpatient Prescriptions on File Prior to Encounter   Medication Sig Dispense Refill     acetaminophen 500 mg coapsule Two capsules three times a day as needed for pain 500 capsule 1     amLODIPine (NORVASC) 5 MG tablet TAKE 1 PILL BY MOUTH DAILY FOR BLOOD PRESSURE / TXHUA HNUB NOJ 1 LUB TSHUAJ PAB ZOO AMANDA NTSHAV SIAB 90 tablet 2     atorvastatin (LIPITOR) 20 MG tablet Take 1 tablet (20 mg total) by mouth daily. 90 tablet 3     blood pressure monitor Kit Electronic onitor blood pressure as needed. 1 each 0     calcium carbonate-vitamin D3 (CALCIUM 600 WITH VITAMIN D3) 600 mg(1,500mg) -500 unit cap Take 1 capsule by mouth 2 (two) times a day. 180 capsule 0     lisinopril (PRINIVIL,ZESTRIL) 20 MG tablet Take 1 tablet (20 mg total) by mouth daily. 90 tablet 1     pregabalin (LYRICA) 50 MG capsule Take 1 capsule (50 mg total) by mouth 3 (three) times a day. 90 capsule 2     VOLTAREN 1 % Gel        No  current facility-administered medications on file prior to encounter.        No Known Allergies    Past Medical History:   Diagnosis Date     Hyperlipidemia         Patient Active Problem List   Diagnosis     Hyperlipidemia     Myalgia And Myositis     Tingling (Paresthesia)     Essential hypertension     Disturbance Of Gait     Urinary Incontinence     Backache     Headache     Low bone mass       No past surgical history on file.    Family History   Problem Relation Age of Onset     No Medical Problems Son        Reviewed past medical, surgical, and family history with patient found on new patient intake packet located in [employer]MR [employer] tab.     SOCIAL HX: Patient denies smoking/tobacco use, denies alcohol use, denies history of being a heavy drinker, denies recreational drug use.    ROS: Positive for back pain, leg pain.  Specifically negative for bowel/bladder dysfunction, balance changes, headache, dizziness, foot drop, fevers, chills, appetite changes, nausea/vomiting, unexplained weight loss. Otherwise 13 systems reviewed are negative. Please see the patient's intake questionnaire from today for details.    OBJECTIVE:  /66 (Patient Site: Left Arm, Patient Position: Sitting)  Pulse 67  Wt 158 lb (71.7 kg)  SpO2 95%  BMI 32.46 kg/m2    PHYSICAL EXAMINATION:    --CONSTITUTIONAL:  Vital signs as above.  No acute distress.  The patient is well nourished and well groomed.  --PSYCHIATRIC:  Appropriate mood and affect. The patient is awake, alert, oriented to person, place, time and answering questions appropriately with clear speech.    --SKIN:  Skin over the face, bilateral lower extremities, and posterior torso is clean, dry, intact without rashes.    --RESPIRATORY: Normal rhythm and effort. No abnormal accessory muscle breathing patterns noted.   --STANDING EXAMINATION:  Normal lumbar lordosis noted, no lateral shift.  --MUSCULOSKELETAL: Lumbar spine inspection reveals no evidence of deformity. Range of  motion is not limited in lumbar flexion, extension, lateral rotation. No tenderness to palpation lumbar spine. Straight leg raising in the supine position is negative to radicular pain. Sciatic notch non-tender.  --GROSS MOTOR: Gait is non-antalgic. Easily arises from a seated position. Toe walking and heel walking are normal without significant difficulty.    --LOWER EXTREMITY MOTOR TESTING:  Plantar flexion left 5/5, right 5/5   Dorsiflexion left 5/5, right 5/5   Great toe MTP extension left 5/5, right 5/5  Knee flexion left 5/5, right 5/5  Knee extension left 5/5, right 5/5   Hip flexion left 5/5, right 5/5  Hip abduction left 5/5, right 5/5  Hip adduction left 5/5, right 5/5   --HIPS: Full range of motion bilaterally. Negative FABERs on the involved lower extremity.   --NEUROLOGICAL:  2/4 patellar, medial hamstring, and achilles reflexes bilaterally.  Sensation to light touch is intact in the bilateral L4, L5, and S1 dermatomes. Babinski is negative. No clonus.  Negative Roberto reflex bilaterally.  --VASCULAR:  2/4 dorsalis pedis and posterior tibialsi pulses bilaterally.  Bilateral lower extremities are warm.  There is no pitting edema of the bilateral lower extremities.    RESULTS: Prior medical records from [facility] 3/31/[facility] to current were reviewed today.    Imaging: Lumbar spine Imaging was personally reviewed and interpreted today. The images were shown to the patient and the findings were explained using a spine model.      XR LUMBAR SPINE 2 OR 3 VWS  3/31/2017   INDICATION: Dorsalgia.  COMPARISON: None.  FINDINGS: There are 5 lumbar type vertebral bodies. The vertebral body heights are well-maintained throughout. The S1 vertebral body is partially lumbarized in nature. There is a slight anterior listhesis of L4 in relation to L5 with the rest of the   lumbar spine having good anatomic alignment. The disc space heights are well-maintained throughout. There is facet arthropathy from L4 to the sacrum.  Vascular calcification is noted within the abdominal aorta and its branches.

## 2021-06-17 NOTE — PROGRESS NOTES
Usually sees Dr Gonzalez    Back pain  If sit too long.  Skin feels tight puffy face and legs.   If watch tv long time.  Couple wks.  Has gain wt.    Back ache a year.  Worsening.  Goes to bilateral upper buttocks.  ?SI areas.  Last visit got calcium.  No med trials.   No continence issues.    On Hypertension med  On Hyperlipidemia med  And calcium    Forgets once a week    Denies prior hx of issues with Hypertension med.        Chart shows hx lis.   And current amlodipine.   Hx hctz which was stopped.  Chart suggests she should still be on the lisinopril    ROS: as noted above    OBJECTIVE:   Vitals:    03/30/18 0942   BP: 152/76   Pulse:    Resp:    Temp:       Eyes: non icteric, noninflamed  Lungs: no resp distress  Heart: regular  Ankles: no edema  Muscles: nontender  Mental status: euthymic  Neuro: nonfocal    bmi 33    ASSESSMENT/PLAN:  bp poorly controlled/ resume pcp meds and follow up pcp   Amlodipine 5 with lis 20  acetaminophen for back.  Doses discussed.  Pt with nl lft  Chronic issues stable/ same treatment.   Life style modification for wt discussed    Additional diagnoses and related orders:  1. Essential hypertension     2. Backache  acetaminophen 500 mg coapsule   3. Hyperlipidemia  Comprehensive Metabolic Panel    LDL Cholesterol, Direct   4. Benign hypertension  lisinopril (PRINIVIL,ZESTRIL) 20 MG tablet   5. Obesity

## 2021-06-17 NOTE — PROGRESS NOTES
New patient evaluation  --PCP referred  --Patient reports of having midline lumbar pain x 2 years  --C/O new B/L buttock and B/L posterior thigh x 4-5 months  --No known injury  --Rates pain 4/10 now  --Walking/standing makes pain worse and sitting helps per pt  --Xray lumbar spine 3/31/17  --No hx of spine surgery, injection, PT  --Chiro/Acupuncture 9 months ago with no relief per pt    Medication  --Tylenol 500 mg 2 caps TID PRN  --Unable to review other meds since pt did not bring in her bottles

## 2021-06-17 NOTE — PROGRESS NOTES
Subjective:  77 y.o. female with concerns of follow up.  Back pain.  Over one year  Worse with walking, but pain at rest.  History does not suggest radiating pain  Hopes to get massage  Takes tylenol. Helps some.  Not enough.    Other pain includes burning from toes to knees.  Some hand symptoms.  Blood sugar tests have been reasonable.    Goes to [employer].  Has been exposed to TB there.  No symptoms.    Outpatient Medications Prior to Visit   Medication Sig Dispense Refill     acetaminophen 500 mg coapsule Two capsules three times a day as needed for pain 500 capsule 1     amLODIPine (NORVASC) 5 MG tablet TAKE 1 PILL BY MOUTH DAILY FOR BLOOD PRESSURE / TXHUA HNUB NOJ 1 LUB TSHUAJ PAB ZOO AMANDA NTSHAV SIAB 90 tablet 2     atorvastatin (LIPITOR) 20 MG tablet Take 1 tablet (20 mg total) by mouth daily. 90 tablet 3     blood pressure monitor Kit Electronic onitor blood pressure as needed. 1 each 0     lisinopril (PRINIVIL,ZESTRIL) 20 MG tablet Take 1 tablet (20 mg total) by mouth daily. 90 tablet 1     calcium carbonate-vitamin D3 (CALCIUM 600 WITH VITAMIN D3) 600 mg(1,500mg) -500 unit cap Take 1 capsule by mouth 2 (two) times a day. 180 capsule 0     VOLTAREN 1 % Gel        acetaminophen (TYLENOL) 500 MG tablet Take 1-2 pills up to 4 times a day prn for headaches.       ibuprofen (ADVIL,MOTRIN) 400 MG tablet Take 1 tablet (400 mg total) by mouth every 6 (six) hours as needed for pain. 30 tablet 0     No facility-administered medications prior to visit.       History   Smoking Status     Never Smoker   Smokeless Tobacco     Never Used     Comment: no second hand smoke exposure      Objective:  /80 (Patient Site: Left Arm, Patient Position: Sitting, Cuff Size: Adult Regular)  Pulse 64  Wt 161 lb (73 kg)  BMI 33.08 kg/m2  GENERAL: alert, not distressed  CHEST: clear, no rales, rhonchi, or wheezes  CARDIAC: regular without murmur  ABDOMEN: obese, soft, non tender, non distended, normal bowel sounds  MS Hip  rotation ability mod to severely reduced.  Cannot engage HERB test well.  Tenderness in SIs, para spinous lumbar muscles.  NEURO: negative SLR bilaterally.    Assessment and Plan:   1. Neuropathic pain  Stocking neuropathy ? Glove as well.    - pregabalin (LYRICA) 50 MG capsule; Take 1 capsule (50 mg total) by mouth 3 (three) times a day.  Dispense: 90 capsule; Refill: 2  - Thyroid Stimulating Hormone (TSH)  - Vitamin B12    2. Back pain  pregabalin and  - Ambulatory referral to [facility]    3. Low bone mass  Repeat DXA one year.  - calcium carbonate-vitamin D3 (CALCIUM 600 WITH VITAMIN D3) 600 mg(1,500mg) -500 unit cap; Take 1 capsule by mouth 2 (two) times a day.  Dispense: 180 capsule; Refill: 0    4. Decreased range of motion of hip  Would anticipate PT as part of spine care program.  - Ambulatory referral to [facility]    5. Screening for tuberculosis  Discussed screening is to determine who needs treatment for LTBI.  Discussed what LTBI treatment entails.  Patient chose to be screened.  - QTF-Mycobacterium tuberculosis by [facility]     This visit was conducted with the aid of a professional .

## 2021-06-18 NOTE — PROGRESS NOTES
"[facility] Daily Progress     Patient Name: Yudi Fragoso  Date: 5/16/2018  Visit #: 2  PTA visit #:  -  Referral Diagnosis: spondylolisthesis at L4-5, chronic B LBP, B sciatica  Referring provider: Temo Gonzalez MD  Visit Diagnosis:     ICD-10-CM    1. Chronic bilateral low back pain with bilateral sciatica M54.42     M54.41     G89.29    2. Generalized muscle weakness M62.81    3. Spondylolisthesis, lumbar region M43.16        Past Medical History:   Diagnosis Date     Hyperlipidemia          Assessment:   Patient tolerated new exercises well w/o increase in pain today.    HEP/POC compliance is  good .  Patient demonstrates understanding/independence with home program.  Patient is benefitting from skilled physical therapy and is making steady progress toward functional goals.  Patient is appropriate to continue with skilled physical therapy intervention, as indicated by initial plan of care.    Goal Status:  Pt. will demonstrate/verbalize independence in self-management of condition in : 6 weeks  Pt. will be independent with home exercise program in : 6 weeks  Pt will: be able to sit for 1 hour with less pain within 6 weeks in order to improve QOL.    Plan / Patient Education:     Continue with initial plan of care.  Progress with home program as tolerated.    Exercises:  Exercise #1: supine hip flexor stretch  Comment #1: 30\" holds, 3 reps each side  Exercise #2: modified piriformis stretch  Comment #2: 30\" holds, 3 reps each side  Exercise #3: s/l clamshell with L1  Comment #3: until fatigue, both sides  Exercise #4: abd set + march  Comment #4: x15 B  Exercise #5: bridge  Comment #5: x15 with 5\" holds    Plan for next session: review exs, change march to ext, abd set+knee fall out, LTR    Subjective:     Pain Rating: 3    Not sure if she's made progress yet. Pain is into her back and down both legs     helping with today's session: Angel Burkett from [place]      Objective:     Sit to " "stand from mat w/o use of UE's: 11x/30 sec  Min cueing needed for 30 second holds for stretches  Patient stating at 5\" holds were too easy. Increased holds to 20\"       Treatment Today     TREATMENT MINUTES COMMENTS   Evaluation     Self-care/ Home management     Manual therapy     Neuromuscular Re-education     Therapeutic Activity     Therapeutic Exercises 23 Nu' Step 5' L4. Reviewed HEP. Discussed progress. Objective measures taken. Progressed HEP- see flow sheet for details.   Gait training     Modality__________________                Total 23    Blank areas are intentional and mean the treatment did not include these items.       Omid Kincaid, PT, DPT  5/16/2018    "

## 2021-06-18 NOTE — PROGRESS NOTES
[facility] Discharge Summary  Patient Name: Yudi Fragoso  Date: 6/29/2018  Referral Diagnosis: chronic B LBP w/ B sciatica  Referring provider: Temo Gonzalez MD  Visit Diagnosis:   1. Chronic bilateral low back pain with bilateral sciatica     2. Generalized muscle weakness     3. Spondylolisthesis, lumbar region         Goals (NOT MET):  Pt. will demonstrate/verbalize independence in self-management of condition in : 6 weeks  Pt. will be independent with home exercise program in : 6 weeks  Pt will: be able to sit for 1 hour with less pain within 6 weeks in order to improve QOL.    Patient was seen for 3 visits with 1 missed appointments.  The patient attended therapy initially, but did not finish the therapy sessions prescribed.  Goals were not fully achieved. Explanation for goals not achieved: Patient did not complete therapy  The patient discontinued therapy, did not return.  The patient was issued a HEP and instructed in proper usage.    Therapy will be discontinued at this time.  The patient will need a new referral to resume.    Thank you for your referral.  Omid Kincaid, PT, DPT  6/29/2018  8:46 AM  [facility] Daily Progress     Patient Name: Yudi Fragoso  Date: 5/23/2018  Visit #: 3  PTA visit #:  -  Referral Diagnosis: spondylolisthesis at L4-5, chronic B LBP, B sciatica  Referring provider: Kateryna Menard  Visit Diagnosis:     ICD-10-CM    1. Chronic bilateral low back pain with bilateral sciatica M54.42     M54.41     G89.29    2. Generalized muscle weakness M62.81    3. Spondylolisthesis, lumbar region M43.16        Past Medical History:   Diagnosis Date     Hyperlipidemia          Assessment:   Patient states that she is going on vacation to [place] for the next few weeks so she is unable to set up another PT appointment. She will call to set up another appointment when she gets back. Patient has made limited progress thus far, but has only had 2 follow up visits  "of PT.    HEP/POC compliance is  good .  Patient demonstrates understanding/independence with home program.  Patient is benefitting from skilled physical therapy and is making steady progress toward functional goals.  Patient is appropriate to continue with skilled physical therapy intervention, as indicated by initial plan of care.    Goal Status:  Pt. will demonstrate/verbalize independence in self-management of condition in : 6 weeks  Pt. will be independent with home exercise program in : 6 weeks  Pt will: be able to sit for 1 hour with less pain within 6 weeks in order to improve QOL.    Plan / Patient Education:     Continue with initial plan of care.  Progress with home program as tolerated.    Exercises:  Exercise #1: supine hip flexor stretch  Comment #1: 30\" holds, 3 reps each side  Exercise #2: modified piriformis stretch  Comment #2: 30\" holds, 3 reps each side  Exercise #3: s/l clamshell with L1  Comment #3: until fatigue, both sides  Exercise #4: abd set + single leg ext  Comment #4: x15 B  Exercise #5: bridge  Comment #5: x15 with 5\" holds  Exercise #6: prayer stretch  Comment #6: 30\" holds x3  Exercise #7: bird/dog with legs only  Comment #7: x15 B (mod cueing needed)    Plan for next session:  Review bird/dog and add arms if able, try side plank again, progress bridge if able, abd set+knee fall out, LTR    Subjective:     Pain Ratin-3, both sides of low back. Pain is intermittent    Sometimes she feels better, and sometimes she doesn't. Not icing     helping with today's session: Angel Burkett from [place]      Objective:     shortness of breath with Nu' step  Mod-Max cueing needed with all exercises today      Treatment Today     TREATMENT MINUTES COMMENTS   Evaluation     Self-care/ Home management     Manual therapy     Neuromuscular Re-education     Therapeutic Activity     Therapeutic Exercises 23 Nu' Step 5' L6. Reviewed HEP. Discussed progress. Objective measures taken. Progressed " HEP- see flow sheet for details. Encouraged patient to ice her back when she's having pain   Gait training     Modality__________________                Total 23    Blank areas are intentional and mean the treatment did not include these items.       Omid Kincaid, PT, DPT  5/23/2018

## 2021-06-19 NOTE — LETTER
Letter by Romel Huffman MD at      Author: Romel Huffman MD Service: -- Author Type: --    Filed:  Encounter Date: 7/2/2019 Status: (Other)         Yudi Fragoso  [address]  [place] [address]             July 5, 2019        Dear MsTang Fragoso,    Below are the results from your recent visit:    Cologuard results were Negative.          Please call with questions or contact us using [facility].    Sincerely,        Electronically signed by Romel Huffman MD

## 2021-06-19 NOTE — LETTER
Letter by Bisi Perdomo RN at      Author: Bisi Perdomo RN Service: -- Author Type: --    Filed:  Encounter Date: 8/8/2019 Status: (Other)       August 8, 2019      YUDI DELMA MEJIA  [address]  [place] [address]      Dear Yudi Pierre:    At [facility], we are dedicated to improving your health and well-being. Enclosed is the Comprehensive Care Plan that we developed with you on 7/17/19. Please review the Care Plan carefully.    As a reminder, some of the things we discussed at your visit include:    Your physical and mental health    Ways to reduce falls    Health care needs you may have    Dont forget to contact your care coordinator if you:    Have been hospitalized or plan to be hospitalized     Have had a fall     Have experienced a change in physical health    Are experiencing emotional problems     If you do not agree with your Care Plan, have questions about it, or have experienced a change in your needs, please call me at [phone]. If you are hearing impaired, please call the [place] [employer] at 577 or [phone] ([email or web address] relay service).    Sincerely,      Bisi Perdomo RN    E-mail: [email or web address]  Phone: [phone]    Care Manager  [facility] is a health plan that contracts with both Medicare and the [place] Medical Assistance (Medicaid) program to provide benefits of both programs to enrollees. Enrollment in [facility] depends on contract renewal.    [id number]     [id number] (11/18)

## 2021-06-20 NOTE — LETTER
Letter by Bisi Perdomo RN at      Author: Bisi Perdomo RN Service: -- Author Type: --    Filed:  Encounter Date: 6/19/2020 Status: (Other)           June 26, 2020      YUDI MEJIA  [address]  [place] [address]        Dear Yudi Pierre:     Your Care Coordinator has been unable to reach you by telephone. I am writing to ask you or a family member to call me at [phone]. If you reach my voicemail, please leave a message with your daytime telephone number and a date and time that I can call you. If you are hearing impaired, please call the [place] Relay at 296 or [phone] (svebbj-jz-axmdcs relay service).     The reason I am trying to reach you is:    [] Six (6) month check-in  [x] To schedule your annual assessment  [] Other:      Please call me as soon as you receive this letter. I look forward to speaking with you.    Sincerely,      Bisi Perdomo RN    E-mail: [email or web address]  Phone: [phone]    Care Manager  [facility] ([facility]) is a health plan that contracts with both Medicare and the [place] Medical Assistance (Medicaid) program to provide benefits of both programs to enrollees. Enrollment in [facility] depends on contract renewal.    [id number])    [id number] (11/18)

## 2021-06-20 NOTE — LETTER
Letter by Bisi Perdomo RN at      Author: Bisi Perdomo RN Service: -- Author Type: --    Filed:  Encounter Date: 6/15/2020 Status: (Other)       Grazyna 15, 2020    YUDI MEJIA  [address]  [place] [address]      Dear Yudi Pierre,    Welcome to [facility] ([facility]) health program. My name is Bisi Perdomo RN. I am your [facility] care coordinator.     I will call you soon to see how you are doing and determine what needs you may have. My job is to help connect you to services, complete an assessment, and develop a care plan with you. There is no charge to you for the care coordination and assessment services. Our goal is to keep you as healthy and independent as possible.     [facility] combines the benefits you may already receive from Medical Assistance, Medicare and the Prescription Drug Coverage Program.    Soon you will receive a new [facility] member identification (ID) card from [facility]. When you receive it, please use this card where you get your health services.]    If you have questions, please call me at [phone]. If you reach my voice mail, leave a message and your phone number. If you are hearing impaired, please call the [place] Relay at 882 or [phone] (aewbsd-mn-rubbbl relay service).    Sincerely,      Bisi Perdomo RN    E-mail: [email or web address]  Phone: [phone]    Care Manager  [facility] ([facility]) is a health plan that contracts with both Medicare and the [place] Medical Assistance (Medicaid) program to provide benefits of both programs to enrollees. Enrollment in [facility] depends on contract renewal.    [facility] [id number] DHS Approved ([id number])  [id number] (11/18)

## 2021-06-26 NOTE — PROGRESS NOTES
[facility] Care Coordination  [facility] Home Visit Assessment     Phone assessment completed due to Covid 19 for Health Risk Assessment with Ydui Fragoso completed on 5-    Type of residence:: Apartment - handicap accessible  Current living arrangement:: I live alone     Assessment completed with: Patient, Care Team Member    Current Care Plan  Member currently receiving the following home care services:     Member currently receiving the following community resources: Day Care, Transportation Services       Medication Review  Medication reconciliation completed in Epic: IF NO, PLEASE EXPLAIN Member unable to identify medications during phone assessment.  Medication set-up & administration: Family/informal caregiver sets up daily  Self-administers medications  Medication Risk Assessment Medication (1 or more, place referral to MTM):  N/A: No risk factors identified  MTM Referral Placed: No: No risk factors idenified    Mental/Behavioral Health   Depression Screening:    PHQ-2 Total Score: 0       Mental health DX:: No        Falls Assessment:   Fallen 2 or more times in the past year?: No   Any fall with injury in the past year?: No    ADL/IADL Dependencies:   Dependent ADLs:: Bathing, Eating, Toileting  Dependent IADLs:: Cooking, Meal Preparation, Money Management, Laundry    [facility] Health Plan sponsored benefits: Shared information re: Silver Sneakers/gym memberships, ASA, Calcium +D.    PCA Assessment completed at visit: Member declined.     Elderly Waiver Eligibility: Yes - will open to EW    Care Plan & Recommendations: Per POC.    See LTCC for detailed assessment information.    Follow-Up Plan: Member informed of future contact, plan to f/u with member with a 6 month telephone assessment.  Contact information shared with member and family, encouraged member to call with any questions or concerns at any time.    [place] care continuum providers: Please refer to Health Care Home on  the Epic Problem List to view this patient's [facility] Care Plan Summary.    Bisi Perdomo RN  [facility]  [phone]

## 2021-06-26 NOTE — PROGRESS NOTES
[facility] Care Coordination    Writer received voicemail from Ashia ([facility] [phone]) asking if their adult-day-care can start billing for 6 hours of adult-day-care again.    Writer returned call to Ashia and stated that this member has been off of EW since July of 2020 because member was an [id number].  Writer explained that writer just completed an annual assessment with the member on 5-26-21 and asked the member at that time if writer can help open member up to EW services.  Member declined to be opened to EW.  Writer didn't think member understood writer's request to open member up to EW again because member just told writer that she was still attending the adult-day-care.  Writer reexplained that if member wants to continue going, writer will send her paperwork to resign so member will be allowed to continue attending without any issues in the future.  Member finally agreed to sign paperwork to be reopened to EW.      Ashia stated understanding and will contact writer if she has any questions.    Bisi Perdomo RN  [facility]  [phone]

## 2021-07-03 NOTE — ADDENDUM NOTE
Addendum Note by Clay Esquivel CMA at 4/17/2018  9:24 AM     Author: Clay Esquivel CMA Service: -- Author Type: Certified Medical Assistant    Filed: 4/17/2018  9:24 AM Encounter Date: 4/13/2018 Status: Signed    : Clay Esquivel CMA (Certified Medical Assistant)    Addended by: CLAY ESQUIVEL on: 4/17/2018 09:24 AM        Modules accepted: Orders

## 2021-07-15 DIAGNOSIS — E78.5 HYPERLIPIDEMIA, UNSPECIFIED HYPERLIPIDEMIA TYPE: ICD-10-CM

## 2021-07-15 NOTE — TELEPHONE ENCOUNTER
Pending Prescriptions:                       Disp   Refills    atorvastatin (LIPITOR) 40 MG tablet       90 tab*3            Sig: [ATORVASTATIN (LIPITOR) 40 MG TABLET] TAKE 1 PILL           BY MOUTH EVERY NIGHT AT BEDTIME FOR CHOLESTEROL /           TXHUA HMO NOULI 1 LUB TSHUAJ THAUM MUS PW PAB           NTSHAV MUAJ ROJ

## 2021-07-16 RX ORDER — ATORVASTATIN CALCIUM 40 MG/1
TABLET, FILM COATED ORAL
Qty: 90 TABLET | Refills: 0 | Status: SHIPPED | OUTPATIENT
Start: 2021-07-16 | End: 2021-10-19

## 2021-07-29 ENCOUNTER — PATIENT OUTREACH (OUTPATIENT)
Dept: GERIATRIC MEDICINE | Facility: CLINIC | Age: 81
End: 2021-07-29

## 2021-10-19 ENCOUNTER — OFFICE VISIT (OUTPATIENT)
Dept: FAMILY MEDICINE | Facility: CLINIC | Age: 81
End: 2021-10-19
Payer: COMMERCIAL

## 2021-10-19 VITALS
DIASTOLIC BLOOD PRESSURE: 75 MMHG | HEART RATE: 79 BPM | BODY MASS INDEX: 32.25 KG/M2 | RESPIRATION RATE: 18 BRPM | WEIGHT: 160 LBS | SYSTOLIC BLOOD PRESSURE: 130 MMHG | HEIGHT: 59 IN

## 2021-10-19 DIAGNOSIS — Z60.3 LANGUAGE BARRIER: ICD-10-CM

## 2021-10-19 DIAGNOSIS — M85.80 LOW BONE MASS: ICD-10-CM

## 2021-10-19 DIAGNOSIS — R20.9 DISTURBANCE OF SKIN SENSATION: Primary | ICD-10-CM

## 2021-10-19 DIAGNOSIS — E78.5 HYPERLIPIDEMIA, UNSPECIFIED HYPERLIPIDEMIA TYPE: ICD-10-CM

## 2021-10-19 DIAGNOSIS — Z75.8 LANGUAGE BARRIER: ICD-10-CM

## 2021-10-19 DIAGNOSIS — I10 ESSENTIAL HYPERTENSION: ICD-10-CM

## 2021-10-19 LAB
ALT SERPL W P-5'-P-CCNC: 21 U/L (ref 0–45)
ANION GAP SERPL CALCULATED.3IONS-SCNC: 10 MMOL/L (ref 5–18)
BUN SERPL-MCNC: 16 MG/DL (ref 8–28)
CALCIUM SERPL-MCNC: 10.1 MG/DL (ref 8.5–10.5)
CHLORIDE BLD-SCNC: 98 MMOL/L (ref 98–107)
CHOLEST SERPL-MCNC: 217 MG/DL
CO2 SERPL-SCNC: 32 MMOL/L (ref 22–31)
CREAT SERPL-MCNC: 0.82 MG/DL (ref 0.6–1.1)
FASTING STATUS PATIENT QL REPORTED: YES
GFR SERPL CREATININE-BSD FRML MDRD: 68 ML/MIN/1.73M2
GLUCOSE BLD-MCNC: 98 MG/DL (ref 70–125)
HDLC SERPL-MCNC: 68 MG/DL
LDLC SERPL CALC-MCNC: 124 MG/DL
POTASSIUM BLD-SCNC: 3.8 MMOL/L (ref 3.5–5)
SODIUM SERPL-SCNC: 140 MMOL/L (ref 136–145)
TRIGL SERPL-MCNC: 123 MG/DL

## 2021-10-19 PROCEDURE — 80061 LIPID PANEL: CPT | Performed by: FAMILY MEDICINE

## 2021-10-19 PROCEDURE — 84460 ALANINE AMINO (ALT) (SGPT): CPT | Performed by: FAMILY MEDICINE

## 2021-10-19 PROCEDURE — 80048 BASIC METABOLIC PNL TOTAL CA: CPT | Performed by: FAMILY MEDICINE

## 2021-10-19 PROCEDURE — 99214 OFFICE O/P EST MOD 30 MIN: CPT | Performed by: FAMILY MEDICINE

## 2021-10-19 PROCEDURE — 36415 COLL VENOUS BLD VENIPUNCTURE: CPT | Performed by: FAMILY MEDICINE

## 2021-10-19 RX ORDER — ATORVASTATIN CALCIUM 40 MG/1
TABLET, FILM COATED ORAL
Qty: 90 TABLET | Refills: 0 | Status: SHIPPED | OUTPATIENT
Start: 2021-10-19 | End: 2022-03-21

## 2021-10-19 RX ORDER — HYDROCHLOROTHIAZIDE 25 MG/1
25 TABLET ORAL DAILY
Qty: 90 TABLET | Refills: 3 | Status: SHIPPED | OUTPATIENT
Start: 2021-10-19 | End: 2022-03-21

## 2021-10-19 SDOH — SOCIAL STABILITY - SOCIAL INSECURITY: ACCULTURATION DIFFICULTY: Z60.3

## 2021-10-19 ASSESSMENT — MIFFLIN-ST. JEOR: SCORE: 1101.39

## 2021-10-19 NOTE — PROGRESS NOTES
Subjective:  81 year old female with concerns follow-up on hypertension and cholesterol.  Taking blood pressure medication.  Has no side effects.  No dizziness.    Continues to have some burning and numb pain in lower extremities.  Tingling into legs.  They are about the same on both sides.  Has done PT.  Didn't help much.  B12, glucose, TSH all normal.  She has some neck pain as well.  She has declined to have evaluation by spine clinic or with imaging as she does not want to pursue any surgical interventions or injectable medications.    Outpatient Medications Prior to Visit   Medication Sig Dispense Refill     acetaminophen 500 mg coapsule [ACETAMINOPHEN 500 MG COAPSULE] Take 1,000 mg by mouth every 6 (six) hours as needed for pain. Two capsules three times a day as needed for pain 500 capsule 1     blood pressure monitor Kit [BLOOD PRESSURE MONITOR KIT] Disp 1 brachial BP meter with digital readout. 1 each 0     atorvastatin (LIPITOR) 40 MG tablet [ATORVASTATIN (LIPITOR) 40 MG TABLET] TAKE 1 PILL BY MOUTH EVERY NIGHT AT BEDTIME FOR CHOLESTEROL / TXHUA HMO NOJ 1 LUB TSHUAJ THAUM MUS PW PAB NTSHAV MUAJ ROJ 90 tablet 0     calcium carbonate-vitamin D3 (CALCIUM 600 WITH VITAMIN D3) 600 mg(1,500mg) -500 unit cap [CALCIUM CARBONATE-VITAMIN D3 (CALCIUM 600 WITH VITAMIN D3) 600 MG(1,500MG) -500 UNIT CAP] Take 1 capsule by mouth 2 (two) times a day. (Patient not taking: Reported on 10/19/2021) 180 capsule 0     hydroCHLOROthiazide (HYDRODIURIL) 25 MG tablet [HYDROCHLOROTHIAZIDE (HYDRODIURIL) 25 MG TABLET] Take 1 tablet (25 mg total) by mouth daily. 90 tablet 3     multivitamin (ONE A DAY) per tablet [MULTIVITAMIN (ONE A DAY) PER TABLET] Take 1 tablet by mouth daily. 120 tablet 2     No facility-administered medications prior to visit.      History   Smoking Status     Never Smoker   Smokeless Tobacco     Never Used     Comment: no second hand smoke exposure      Objective:  /75 (BP Location: Right arm, Patient  "Position: Sitting, Cuff Size: Adult Regular)   Pulse 79   Resp 18   Ht 1.499 m (4' 11\")   Wt 72.6 kg (160 lb)   BMI 32.32 kg/m    GENERAL: alert, not distressed  CHEST: clear, no rales, rhonchi, or wheezes  CARDIAC: regular without murmur, gallop, or rub  ABDOMEN: soft, non tender, non distended, normal bowel sounds  NEURO: Normal gait.  Straight leg raise negative.  Normal reflexes at patella and Achilles bilaterally.    Recent Results (from the past 240 hour(s))   Basic metabolic panel  (Ca, Cl, CO2, Creat, Gluc, K, Na, BUN)    Collection Time: 10/19/21  8:56 AM   Result Value Ref Range    Sodium 140 136 - 145 mmol/L    Potassium 3.8 3.5 - 5.0 mmol/L    Chloride 98 98 - 107 mmol/L    Carbon Dioxide (CO2) 32 (H) 22 - 31 mmol/L    Anion Gap 10 5 - 18 mmol/L    Urea Nitrogen 16 8 - 28 mg/dL    Creatinine 0.82 0.60 - 1.10 mg/dL    Calcium 10.1 8.5 - 10.5 mg/dL    Glucose 98 70 - 125 mg/dL    GFR Estimate 68 >60 mL/min/1.73m2   Lipid panel reflex to direct LDL Fasting    Collection Time: 10/19/21  8:56 AM   Result Value Ref Range    Cholesterol 217 (H) <=199 mg/dL    Triglycerides 123 <=149 mg/dL    Direct Measure HDL 68 >=50 mg/dL    LDL Cholesterol Calculated 124 <=129 mg/dL    Patient Fasting > 8hrs? Yes    ALT    Collection Time: 10/19/21  8:56 AM   Result Value Ref Range    ALT 21 0 - 45 U/L      Assessment and Plan:   1. Hyperlipidemia, unspecified hyperlipidemia type  Mildly high cholesterol on statin.  Would continue statin at current dose.  - atorvastatin (LIPITOR) 40 MG tablet; [ATORVASTATIN (LIPITOR) 40 MG TABLET] TAKE 1 PILL BY MOUTH EVERY NIGHT AT BEDTIME FOR CHOLESTEROL / TXHUA O NOJ 1 LUB TSHUAJ THAUM MUS PW PAB NTSHAV MUAJ ROJ  Dispense: 90 tablet; Refill: 0    2. Essential hypertension  Reasonable control.  GFR is normal.  Continue hydrochlorothiazide.  - hydrochlorothiazide (HYDRODIURIL) 25 MG tablet; Take 1 tablet (25 mg) by mouth daily  Dispense: 90 tablet; Refill: 3    3. Tingling " (Paresthesia)  Discussed potential for further work-up.  Discussed that structural spine lesion could be causing this.  She did not want to consider imaging because she is not interested in any surgical or injection approach.    4.  Low bone mass  Not currently taking calcium but we discussed the reasons for doing it.  She is okay with having the resend prescription.  - Multiple Vitamin (MULTI VITAMIN) TABS; Take 1 tablet by mouth daily  Dispense: 120 tablet; Refill: 2  - calcium carb-cholecalciferol 600-500 MG-UNIT CAPS; Take 1 capsule by mouth 2 times daily  Dispense: 180 capsule; Refill: 0     Language barrier  This visit was conducted with the aid of a professional .

## 2021-10-20 ENCOUNTER — TELEPHONE (OUTPATIENT)
Dept: FAMILY MEDICINE | Facility: CLINIC | Age: 81
End: 2021-10-20

## 2021-10-20 NOTE — TELEPHONE ENCOUNTER
Left message x 1. Please review message thread below and advise the patient as indicated. Please schedule if necessary or indicated in message thread.      ----- Message from Temo Gonzalez MD sent at 10/20/2021  7:52 AM CDT -----  Call:Your tests look ok.

## 2021-12-01 ENCOUNTER — PATIENT OUTREACH (OUTPATIENT)
Dept: GERIATRIC MEDICINE | Facility: CLINIC | Age: 81
End: 2021-12-01
Payer: COMMERCIAL

## 2021-12-01 NOTE — PROGRESS NOTES
Writer received call from Lucille ([facility] [phone]) stating that she tried billing for the months of June and July of 2021 but [place] shows that the member was not open to EW at that time.    Writer looked in MMIS and saw that writer opened member up to EW on 7- but it should have been opened for 5-26-21.     Writer requested an MMIS entry deletion from [facility].  Once DHS deleted, writer will reenter the effective date as 5-26-21.    Lucille stated understanding.    Bisi Perdomo RN  [facility]  [phone]         12-: After last MMIS entry for 7-22-21 was deleted, writer was able to reenter the correct MMIS opening date with effective date of 6-1-21.  Writer called Lucille with the update and she will bill for the month of June 2021 and July 2021 now.    Bisi Perdomo RN  [facility]  [phone]

## 2022-03-21 ENCOUNTER — OFFICE VISIT (OUTPATIENT)
Dept: FAMILY MEDICINE | Facility: CLINIC | Age: 82
End: 2022-03-21
Payer: COMMERCIAL

## 2022-03-21 VITALS
WEIGHT: 166 LBS | DIASTOLIC BLOOD PRESSURE: 72 MMHG | RESPIRATION RATE: 18 BRPM | HEART RATE: 74 BPM | TEMPERATURE: 97.9 F | SYSTOLIC BLOOD PRESSURE: 135 MMHG | BODY MASS INDEX: 33.53 KG/M2

## 2022-03-21 DIAGNOSIS — I10 ESSENTIAL HYPERTENSION: ICD-10-CM

## 2022-03-21 DIAGNOSIS — E78.5 HYPERLIPIDEMIA, UNSPECIFIED HYPERLIPIDEMIA TYPE: ICD-10-CM

## 2022-03-21 PROCEDURE — 99213 OFFICE O/P EST LOW 20 MIN: CPT | Performed by: FAMILY MEDICINE

## 2022-03-21 RX ORDER — POLYETHYLENE GLYCOL 400 AND PROPYLENE GLYCOL 4; 3 MG/ML; MG/ML
SOLUTION/ DROPS OPHTHALMIC
COMMUNITY
Start: 2021-12-13 | End: 2022-11-30

## 2022-03-21 RX ORDER — ROSUVASTATIN CALCIUM 10 MG/1
10 TABLET, COATED ORAL DAILY
Qty: 90 TABLET | Refills: 1 | Status: SHIPPED | OUTPATIENT
Start: 2022-03-21 | End: 2022-11-30

## 2022-03-21 RX ORDER — HYDROCHLOROTHIAZIDE 25 MG/1
25 TABLET ORAL DAILY
Qty: 90 TABLET | Refills: 3 | Status: SHIPPED | OUTPATIENT
Start: 2022-03-21 | End: 2022-11-30

## 2022-03-21 RX ORDER — INFLUENZA A VIRUS A/VICTORIA/2454/2019 IVR-207 (H1N1) ANTIGEN (PROPIOLACTONE INACTIVATED), INFLUENZA A VIRUS A/HONG KONG/2671/2019 IVR-208 (H3N2) ANTIGEN (PROPIOLACTONE INACTIVATED), INFLUENZA B VIRUS B/VICTORIA/705/2018 BVR-11 ANTIGEN (PROPIOLACTONE INACTIVATED), INFLUENZA B VIRUS B/PHUKET/3073/2013 BVR-1B ANTIGEN (PROPIOLACTONE INACTIVATED) 15; 15; 15; 15 UG/.5ML; UG/.5ML; UG/.5ML; UG/.5ML
INJECTION, SUSPENSION INTRAMUSCULAR
COMMUNITY
Start: 2021-09-10 | End: 2022-11-30

## 2022-03-21 RX ORDER — ATORVASTATIN CALCIUM 40 MG/1
TABLET, FILM COATED ORAL
Qty: 90 TABLET | Refills: 0 | Status: CANCELLED | OUTPATIENT
Start: 2022-03-21

## 2022-03-21 NOTE — PROGRESS NOTES
Subjective:  81 year old female with concerns ***  Needs refills.  HCTZ and atorvastatin.    Cholesterol med give heart burn.  every other day.          Outpatient Medications Prior to Visit   Medication Sig Dispense Refill     acetaminophen 500 mg coapsule [ACETAMINOPHEN 500 MG COAPSULE] Take 1,000 mg by mouth every 6 (six) hours as needed for pain. Two capsules three times a day as needed for pain 500 capsule 1     atorvastatin (LIPITOR) 40 MG tablet [ATORVASTATIN (LIPITOR) 40 MG TABLET] TAKE 1 PILL BY MOUTH EVERY NIGHT AT BEDTIME FOR CHOLESTEROL / TXHUA HMO NOJ 1 LUB TSHUAJ THAUM MUS PW PAB NTSHAV MUAJ ROJ 90 tablet 0     blood pressure monitor Kit [BLOOD PRESSURE MONITOR KIT] Disp 1 brachial BP meter with digital readout. 1 each 0     calcium carb-cholecalciferol 600-500 MG-UNIT CAPS Take 1 capsule by mouth 2 times daily 180 capsule 0     diclofenac (VOLTAREN) 1 % topical gel APPLY 2 GRAMS TO BOTH HEELS 3 TIMES DAILY AS NEEDED FOR PAIN / YOG MOB POB TAWS TXHUA HNUB PLEEV 2 GRAMS PEB ZAUG AMANDA THAJ TSAM MOB       Multiple Vitamin (MULTI VITAMIN) TABS Take 1 tablet by mouth daily 120 tablet 2     SYSTANE 0.4-0.3 % SOLN ophthalmic solution PLACE 1 DROP INTO RIGHT EYES 4 TIMES DAILY/ TXHUA HNUB [place] 1 NCOS AMANDA SAB QHOV MUAG SAB XIS 4 ZAUG.       AFLURIA QUADRIVALENT 0.5 ML injection        hydrochlorothiazide (HYDRODIURIL) 25 MG tablet Take 1 tablet (25 mg) by mouth daily 90 tablet 3     No facility-administered medications prior to visit.      History   Smoking Status     Never Smoker   Smokeless Tobacco     Never Used     Comment: no second hand smoke exposure      Objective:  /72 (BP Location: Left arm, Patient Position: Sitting, Cuff Size: Adult Large)   Pulse 74   Temp 97.9  F (36.6  C) (Temporal)   Resp 18   Wt 75.3 kg (166 lb)   BMI 33.53 kg/m      Assessment and Plan:

## 2022-03-21 NOTE — PROGRESS NOTES
Subjective:  81 year old female with concerns of follow up on HTN and cholesterol.  She is not fasting today.  She reports some difficulty taking atorvastatin.  Seems to cause some heartburn.  Therefore she is taking it about every other day.  Otherwise she does not have any concerns.  She feels her blood pressure has been well controlled when she is checked it at home.    Outpatient Medications Prior to Visit   Medication Sig Dispense Refill     acetaminophen 500 mg coapsule [ACETAMINOPHEN 500 MG COAPSULE] Take 1,000 mg by mouth every 6 (six) hours as needed for pain. Two capsules three times a day as needed for pain 500 capsule 1     blood pressure monitor Kit [BLOOD PRESSURE MONITOR KIT] Disp 1 brachial BP meter with digital readout. 1 each 0     calcium carb-cholecalciferol 600-500 MG-UNIT CAPS Take 1 capsule by mouth 2 times daily 180 capsule 0     diclofenac (VOLTAREN) 1 % topical gel APPLY 2 GRAMS TO BOTH HEELS 3 TIMES DAILY AS NEEDED FOR PAIN / YOG MOB POB TAWS TXHUA HNUB PLEEV 2 GRAMS PEB ZAUG AMANDA THAJ TSAM MOB       Multiple Vitamin (MULTI VITAMIN) TABS Take 1 tablet by mouth daily 120 tablet 2     SYSTANE 0.4-0.3 % SOLN ophthalmic solution PLACE 1 DROP INTO RIGHT EYES 4 TIMES DAILY/ TXHUA HNUB  1 NCOS AMANDA SAB QHOV MUAG SAB XIS 4 ZAUG.       AFLURIA QUADRIVALENT 0.5 ML injection        atorvastatin (LIPITOR) 40 MG tablet [ATORVASTATIN (LIPITOR) 40 MG TABLET] TAKE 1 PILL BY MOUTH EVERY NIGHT AT BEDTIME FOR CHOLESTEROL / TXHUA HMO NOJ 1 LUB TSHUAJ THAUM MUS PW PAB NTSHAV MUAJ ROJ 90 tablet 0     hydrochlorothiazide (HYDRODIURIL) 25 MG tablet Take 1 tablet (25 mg) by mouth daily 90 tablet 3     No facility-administered medications prior to visit.      History   Smoking Status     Never Smoker   Smokeless Tobacco     Never Used     Comment: no second hand smoke exposure      Objective:  /72 (BP Location: Left arm, Patient Position: Sitting, Cuff Size: Adult Large)   Pulse 74   Temp 97.9  F (36.6  C)  (Temporal)   Resp 18   Wt 75.3 kg (166 lb)   BMI 33.53 kg/m    GENERAL: alert, not distressed  CHEST: clear, no rales, rhonchi, or wheezes  CARDIAC: regular without murmur, gallop, or rub    Assessment and Plan:   Yudi was seen today for recheck medication.    Diagnoses and all orders for this visit:    Hyperlipidemia, unspecified hyperlipidemia type  Change to different statin.  Hopefully she tolerates this better and can take it daily.  Not fasting today, so follow-up with fasting labs about a month after she started Crestor.  -     rosuvastatin (CRESTOR) 10 MG tablet; Take 1 tablet (10 mg) by mouth daily  -     Lipid panel reflex to direct LDL Fasting; Future    Essential hypertension  Reasonable control.  Continue hydrochlorothiazide.  We will check potassium and creatinine with labs when she has her cholesterol checked.  -     hydrochlorothiazide (HYDRODIURIL) 25 MG tablet; Take 1 tablet (25 mg) by mouth daily  -     Basic metabolic panel  (Ca, Cl, CO2, Creat, Gluc, K, Na, BUN); Future  -     ALT; Future     This visit was conducted with the aid of a professional .     We discussed Shingrix vaccine.  She thinks her insurance will not cover it.  Advised to check with pharmacy to see if it is better covered there.    Future Appointments   Date Time Provider Department Center   4/21/2022  7:00 AM [facility]

## 2022-04-21 ENCOUNTER — LAB (OUTPATIENT)
Dept: LAB | Facility: CLINIC | Age: 82
End: 2022-04-21
Payer: COMMERCIAL

## 2022-04-21 DIAGNOSIS — I10 ESSENTIAL HYPERTENSION: ICD-10-CM

## 2022-04-21 DIAGNOSIS — E78.5 HYPERLIPIDEMIA, UNSPECIFIED HYPERLIPIDEMIA TYPE: ICD-10-CM

## 2022-04-21 LAB
ALT SERPL W P-5'-P-CCNC: 19 U/L (ref 0–45)
ANION GAP SERPL CALCULATED.3IONS-SCNC: 12 MMOL/L (ref 5–18)
BUN SERPL-MCNC: 16 MG/DL (ref 8–28)
CALCIUM SERPL-MCNC: 9.4 MG/DL (ref 8.5–10.5)
CHLORIDE BLD-SCNC: 102 MMOL/L (ref 98–107)
CHOLEST SERPL-MCNC: 182 MG/DL
CO2 SERPL-SCNC: 30 MMOL/L (ref 22–31)
CREAT SERPL-MCNC: 0.71 MG/DL (ref 0.6–1.1)
FASTING STATUS PATIENT QL REPORTED: YES
GFR SERPL CREATININE-BSD FRML MDRD: 85 ML/MIN/1.73M2
GLUCOSE BLD-MCNC: 97 MG/DL (ref 70–125)
HDLC SERPL-MCNC: 60 MG/DL
LDLC SERPL CALC-MCNC: 89 MG/DL
POTASSIUM BLD-SCNC: 3 MMOL/L (ref 3.5–5)
SODIUM SERPL-SCNC: 144 MMOL/L (ref 136–145)
TRIGL SERPL-MCNC: 167 MG/DL

## 2022-04-21 PROCEDURE — 36415 COLL VENOUS BLD VENIPUNCTURE: CPT

## 2022-04-21 PROCEDURE — 84460 ALANINE AMINO (ALT) (SGPT): CPT

## 2022-04-21 PROCEDURE — 80048 BASIC METABOLIC PNL TOTAL CA: CPT

## 2022-04-21 PROCEDURE — 80061 LIPID PANEL: CPT

## 2022-04-25 ENCOUNTER — PATIENT OUTREACH (OUTPATIENT)
Dept: GERIATRIC MEDICINE | Facility: CLINIC | Age: 82
End: 2022-04-25
Payer: COMMERCIAL

## 2022-04-26 ENCOUNTER — APPOINTMENT (OUTPATIENT)
Dept: INTERPRETER SERVICES | Facility: CLINIC | Age: 82
End: 2022-04-26
Payer: COMMERCIAL

## 2022-04-26 ENCOUNTER — TELEPHONE (OUTPATIENT)
Dept: FAMILY MEDICINE | Facility: CLINIC | Age: 82
End: 2022-04-26
Payer: COMMERCIAL

## 2022-04-26 DIAGNOSIS — E87.6 HYPOKALEMIA: Primary | ICD-10-CM

## 2022-04-26 RX ORDER — POTASSIUM CHLORIDE 1500 MG/1
20 TABLET, EXTENDED RELEASE ORAL 2 TIMES DAILY
Qty: 90 TABLET | Refills: 0 | Status: SHIPPED | OUTPATIENT
Start: 2022-04-26 | End: 2023-01-13

## 2022-04-26 NOTE — LETTER
April 29, 2022      Yudi Fragoso  [address]   [place] [address]        Dear ,    We are writing to inform you of your test results.    Your potassium is a little low.  You should be taking a supplement to help with this.   I will send rx. Recheck in 1 month.     Resulted Orders   Basic metabolic panel  (Ca, Cl, CO2, Creat, Gluc, K, Na, BUN)   Result Value Ref Range    Sodium 144 136 - 145 mmol/L    Potassium 3.0 (L) 3.5 - 5.0 mmol/L    Chloride 102 98 - 107 mmol/L    Carbon Dioxide (CO2) 30 22 - 31 mmol/L    Anion Gap 12 5 - 18 mmol/L    Urea Nitrogen 16 8 - 28 mg/dL    Creatinine 0.71 0.60 - 1.10 mg/dL    Calcium 9.4 8.5 - 10.5 mg/dL    Glucose 97 70 - 125 mg/dL    GFR Estimate 85 >60 mL/min/1.73m2      Comment:      Effective December 21, 2021 eGFRcr in adults is calculated using the 2021 CKD-EPI creatinine equation which includes age and gender (Re et al., NEJ, DOI: 10.1056/MCHWui3401390)   ALT   Result Value Ref Range    ALT 19 0 - 45 U/L   Lipid panel reflex to direct LDL Fasting   Result Value Ref Range    Cholesterol 182 <=199 mg/dL    Triglycerides 167 (H) <=149 mg/dL    Direct Measure HDL 60 >=50 mg/dL      Comment:      HDL Cholesterol Reference Range:     0-2 years:   No reference ranges established for patients under 2 years old  at [facility] for lipid analytes.    2-8 years:  Greater than 45 mg/dL     18 years and older:   Female: Greater than or equal to 50 mg/dL   Male:   Greater than or equal to 40 mg/dL    LDL Cholesterol Calculated 89 <=129 mg/dL    Patient Fasting > 8hrs? Yes        If you have any questions or concerns, please call the clinic at the number listed above.       Sincerely,      Dr. Gonzalez

## 2022-04-26 NOTE — TELEPHONE ENCOUNTER
----- Message from Temo Gonzalez MD sent at 4/26/2022  7:45 AM CDT -----  Call:  Potassium a little low.  Should take supplement  I will send rx. Recheck in 1 month.

## 2022-04-26 NOTE — TELEPHONE ENCOUNTER
Left message x 1. Please review message thread below and advise the patient as indicated. Please schedule if necessary or indicated in message thread.

## 2022-05-18 NOTE — PROGRESS NOTES
[facility] Home Visit Assessment     Phone assessment completed due to Covid 19 for Health Risk Assessment with Yudi Fragoso completed on April 25, 2022    Type of residence:: Apartment - handicap accessible  Current living arrangement:: I live alone     Assessment completed with:: Patient, Care Team Member    Current Care Plan  Member currently receiving the following home care services:     Member currently receiving the following community resources: Day Care, Transportation Services       Medication Review  Medication reconciliation completed in Epic: If no, please explain member unable to identify medications during phone assessment.  Medication set-up & administration: Family/informal caregiver sets up daily.  Self-administers medications.  Medication Risk Assessment Medication (1 or more, place referral to MTM): N/A: No risk factors identified  MTM Referral Placed: No: No risk factors idenified    Mental/Behavioral Health   Depression Screening:   PHQ-2 Total Score (Adult) - Positive if 3 or more points; Administer PHQ-9 if positive: 0       Mental health DX:: No        Falls Assessment:   Fallen 2 or more times in the past year?: No   Any fall with injury in the past year?: No    ADL/IADL Dependencies:   Dependent ADLs:: Bathing, Eating, Toileting  Dependent IADLs:: Cooking, Meal Preparation, Money Management, Laundry    [facility] Health Plan sponsored benefits: Shared information re: Silver Sneakers/gym memberships, ASA, Calcium +D.    PCA Assessment completed at visit: No     Elderly Waiver Eligibility: Yes-will continue on EW    Care Plan & Recommendations: Per POC.    See LTCC for detailed assessment information.    Follow-Up Plan: Member informed of future contact, plan to f/u with member with a 6 month telephone assessment.  Contact information shared with member and family, encouraged member to call with any questions or concerns at any time.    [place] care continuum providers: Please see  Snapshot and Care Management Flowsheets for Specific details of care plan.    This CC note routed to PCP.    Bisi Perdomo RN  [facility]  [phone]

## 2022-05-23 ENCOUNTER — PATIENT OUTREACH (OUTPATIENT)
Dept: GERIATRIC MEDICINE | Facility: CLINIC | Age: 82
End: 2022-05-23
Payer: COMMERCIAL

## 2022-05-23 NOTE — PROGRESS NOTES
[facility] Care Coordination Contact    Received after visit chart from care coordinator.  Completed following tasks: Mailed copy of care plan to client, Updated services in Database, Submitted referrals/auths for ADC with [facility] Mailed copy of POC signature sheet for member to sign and return in SASE  and Mailed [facility] Safe Medication Disposal    and Provider Signature - No POC Shared:  Member indicates that they do not want their POC shared with any EW providers.     Grabiel Noriega  Care Management Specialist  [facility]  [phone]

## 2022-05-23 NOTE — LETTER
May 23, 2022      YUDI MEJIA  [address]   [place] [address]      Dear Yudi:    At [facility], we are dedicated to improving your health and well-being. Enclosed is the Comprehensive Care Plan that we developed with you on 4/25/22. Please review the Care Plan carefully.    As a reminder, some of the things we discussed at your visit include:    Your physical and mental health    Ways to reduce falls    Health care needs you may have    Don t forget to contact your care coordinator if you:    Have been hospitalized or plan to be hospitalized     Have had a fall     Have experienced a change in physical health    Are experiencing emotional problems     If you do not agree with your Care Plan, have questions about it, or have experienced a change in your needs, please call me at [phone]. If you are hearing impaired, please call the [place] Relay at 012 or [phone] (vgzqah-rb-uhxack relay service).    Sincerely,    Bisi Perdomo RN  [phone]  [email or web address]) is a health plan that contracts with both Medicare and the [place] Medical Assistance (Medicaid) program to provide benefits of both programs to enrollees. Enrollment in [facility] depends on contract renewal.    [id number])     [id number] (11/18)

## 2022-06-27 ENCOUNTER — OFFICE VISIT (OUTPATIENT)
Dept: FAMILY MEDICINE | Facility: CLINIC | Age: 82
End: 2022-06-27
Payer: COMMERCIAL

## 2022-06-27 VITALS
RESPIRATION RATE: 20 BRPM | WEIGHT: 163 LBS | HEART RATE: 56 BPM | SYSTOLIC BLOOD PRESSURE: 138 MMHG | DIASTOLIC BLOOD PRESSURE: 70 MMHG | OXYGEN SATURATION: 98 % | BODY MASS INDEX: 32.92 KG/M2

## 2022-06-27 DIAGNOSIS — E78.5 HYPERLIPIDEMIA, UNSPECIFIED HYPERLIPIDEMIA TYPE: Primary | ICD-10-CM

## 2022-06-27 DIAGNOSIS — E87.6 HYPOKALEMIA: ICD-10-CM

## 2022-06-27 LAB
ANION GAP SERPL CALCULATED.3IONS-SCNC: 8 MMOL/L (ref 5–18)
BUN SERPL-MCNC: 13 MG/DL (ref 8–28)
CALCIUM SERPL-MCNC: 9.6 MG/DL (ref 8.5–10.5)
CHLORIDE BLD-SCNC: 105 MMOL/L (ref 98–107)
CO2 SERPL-SCNC: 29 MMOL/L (ref 22–31)
CREAT SERPL-MCNC: 0.7 MG/DL (ref 0.6–1.1)
GFR SERPL CREATININE-BSD FRML MDRD: 87 ML/MIN/1.73M2
GLUCOSE BLD-MCNC: 92 MG/DL (ref 70–125)
POTASSIUM BLD-SCNC: 4 MMOL/L (ref 3.5–5)
SODIUM SERPL-SCNC: 142 MMOL/L (ref 136–145)

## 2022-06-27 PROCEDURE — 99213 OFFICE O/P EST LOW 20 MIN: CPT | Performed by: FAMILY MEDICINE

## 2022-06-27 PROCEDURE — 36415 COLL VENOUS BLD VENIPUNCTURE: CPT | Performed by: FAMILY MEDICINE

## 2022-06-27 PROCEDURE — 80048 BASIC METABOLIC PNL TOTAL CA: CPT | Performed by: FAMILY MEDICINE

## 2022-06-27 NOTE — PROGRESS NOTES
"  Assessment & Plan     Hyperlipidemia, unspecified hyperlipidemia type  Hypokalemia    Recheck of potassium is normal.  Blood pressure controlled.  Would continue the hydrochlorothiazide, potassium supplement.  Recheck 6 months.     BMI:   Estimated body mass index is 32.92 kg/m  as calculated from the following:    Height as of 10/19/21: 1.499 m (4' 11\").    Weight as of this encounter: 73.9 kg (163 lb).     Return in about 6 months (around 12/27/2022) for Follow up.    Temo Gonzalez MD  [facility]    Cash Bagley is a 81 year old accompanied by her ., presenting for the following health issues:  RECHECK (Follow up on med check )      History of Present Illness       Reason for visit:  Follow up on med    She eats 2-3 servings of fruits and vegetables daily.She consumes 1 sweetened beverage(s) daily.She exercises with enough effort to increase her heart rate 30 to 60 minutes per day.  She exercises with enough effort to increase her heart rate 3 or less days per week.   She is taking medications regularly.       Hypertension Follow-up      Do you check your blood pressure regularly outside of the clinic? Yes     Are you following a low salt diet? Yes    Are your blood pressures ever more than 140 on the top number (systolic) OR more   than 90 on the bottom number (diastolic), for example 140/90? No        Objective    /70 (BP Location: Left arm, Patient Position: Sitting, Cuff Size: Adult Regular)   Pulse 56   Resp 20   Wt 73.9 kg (163 lb)   SpO2 98%   BMI 32.92 kg/m    Body mass index is 32.92 kg/m .  Physical Exam   GENERAL: alert, not distressed  CHEST: clear, no rales, rhonchi, or wheezes  CARDIAC: regular without murmur, gallop, or rub  ABDOMEN: soft, non tender, non distended, normal bowel sounds    Results for orders placed or performed in visit on 06/27/22   Basic metabolic panel  (Ca, Cl, CO2, Creat, Gluc, K, Na, BUN)     Status: Normal   Result Value " Ref Range    Sodium 142 136 - 145 mmol/L    Potassium 4.0 3.5 - 5.0 mmol/L    Chloride 105 98 - 107 mmol/L    Carbon Dioxide (CO2) 29 22 - 31 mmol/L    Anion Gap 8 5 - 18 mmol/L    Urea Nitrogen 13 8 - 28 mg/dL    Creatinine 0.70 0.60 - 1.10 mg/dL    Calcium 9.6 8.5 - 10.5 mg/dL    Glucose 92 70 - 125 mg/dL    GFR Estimate 87 >60 mL/min/1.73m2       ..

## 2022-06-27 NOTE — LETTER
June 28, 2022      Yudi Fragoso  [address]   [place] [address]        Dear ,    We are writing to inform you of your test results.    Results are NORMAL.       Resulted Orders   Basic metabolic panel  (Ca, Cl, CO2, Creat, Gluc, K, Na, BUN)   Result Value Ref Range    Sodium 142 136 - 145 mmol/L    Potassium 4.0 3.5 - 5.0 mmol/L    Chloride 105 98 - 107 mmol/L    Carbon Dioxide (CO2) 29 22 - 31 mmol/L    Anion Gap 8 5 - 18 mmol/L    Urea Nitrogen 13 8 - 28 mg/dL    Creatinine 0.70 0.60 - 1.10 mg/dL    Calcium 9.6 8.5 - 10.5 mg/dL    Glucose 92 70 - 125 mg/dL    GFR Estimate 87 >60 mL/min/1.73m2      Comment:      Effective December 21, 2021 eGFRcr in adults is calculated using the 2021 CKD-EPI creatinine equation which includes age and gender (Re et al., NEJM, DOI: 10.1056/HDWTvn5515735)       If you have any questions or concerns, please call the clinic at the number listed above.       Sincerely,      Temo Gonzalez MD

## 2022-08-19 ENCOUNTER — PATIENT OUTREACH (OUTPATIENT)
Dept: GERIATRIC MEDICINE | Facility: CLINIC | Age: 82
End: 2022-08-19

## 2022-08-19 NOTE — PROGRESS NOTES
CC updated program tasks and targets for [employer] launch.     Trang Mayorga, Manager  [phone]

## 2022-11-29 ENCOUNTER — PATIENT OUTREACH (OUTPATIENT)
Dept: GERIATRIC MEDICINE | Facility: CLINIC | Age: 82
End: 2022-11-29

## 2022-11-30 ENCOUNTER — OFFICE VISIT (OUTPATIENT)
Dept: FAMILY MEDICINE | Facility: CLINIC | Age: 82
End: 2022-11-30
Payer: COMMERCIAL

## 2022-11-30 VITALS
DIASTOLIC BLOOD PRESSURE: 75 MMHG | TEMPERATURE: 97.1 F | HEART RATE: 71 BPM | WEIGHT: 162 LBS | BODY MASS INDEX: 34 KG/M2 | HEIGHT: 58 IN | OXYGEN SATURATION: 99 % | RESPIRATION RATE: 18 BRPM | SYSTOLIC BLOOD PRESSURE: 121 MMHG

## 2022-11-30 DIAGNOSIS — I10 ESSENTIAL HYPERTENSION: ICD-10-CM

## 2022-11-30 DIAGNOSIS — E78.5 HYPERLIPIDEMIA, UNSPECIFIED HYPERLIPIDEMIA TYPE: ICD-10-CM

## 2022-11-30 DIAGNOSIS — M85.80 LOW BONE MASS: Primary | ICD-10-CM

## 2022-11-30 LAB
ALT SERPL W P-5'-P-CCNC: 23 U/L (ref 10–35)
ANION GAP SERPL CALCULATED.3IONS-SCNC: 16 MMOL/L (ref 7–15)
BUN SERPL-MCNC: 19.3 MG/DL (ref 8–23)
CALCIUM SERPL-MCNC: 9.9 MG/DL (ref 8.8–10.2)
CHLORIDE SERPL-SCNC: 95 MMOL/L (ref 98–107)
CHOLEST SERPL-MCNC: 243 MG/DL
CREAT SERPL-MCNC: 0.83 MG/DL (ref 0.51–0.95)
DEPRECATED HCO3 PLAS-SCNC: 27 MMOL/L (ref 22–29)
GFR SERPL CREATININE-BSD FRML MDRD: 70 ML/MIN/1.73M2
GLUCOSE SERPL-MCNC: 88 MG/DL (ref 70–99)
HDLC SERPL-MCNC: 84 MG/DL
LDLC SERPL CALC-MCNC: 123 MG/DL
NONHDLC SERPL-MCNC: 159 MG/DL
POTASSIUM SERPL-SCNC: 3.3 MMOL/L (ref 3.4–5.3)
SODIUM SERPL-SCNC: 138 MMOL/L (ref 136–145)
TRIGL SERPL-MCNC: 182 MG/DL

## 2022-11-30 PROCEDURE — 99214 OFFICE O/P EST MOD 30 MIN: CPT | Performed by: FAMILY MEDICINE

## 2022-11-30 PROCEDURE — 80061 LIPID PANEL: CPT | Performed by: FAMILY MEDICINE

## 2022-11-30 PROCEDURE — 84460 ALANINE AMINO (ALT) (SGPT): CPT | Performed by: FAMILY MEDICINE

## 2022-11-30 PROCEDURE — 36415 COLL VENOUS BLD VENIPUNCTURE: CPT | Performed by: FAMILY MEDICINE

## 2022-11-30 PROCEDURE — 80048 BASIC METABOLIC PNL TOTAL CA: CPT | Performed by: FAMILY MEDICINE

## 2022-11-30 RX ORDER — ROSUVASTATIN CALCIUM 10 MG/1
10 TABLET, COATED ORAL DAILY
Qty: 90 TABLET | Refills: 3 | Status: SHIPPED | OUTPATIENT
Start: 2022-11-30 | End: 2023-08-01

## 2022-11-30 RX ORDER — HYDROCHLOROTHIAZIDE 25 MG/1
25 TABLET ORAL DAILY
Qty: 90 TABLET | Refills: 3 | Status: SHIPPED | OUTPATIENT
Start: 2022-11-30 | End: 2023-08-01

## 2022-11-30 NOTE — PROGRESS NOTES
"  Assessment & Plan     Hyperlipidemia, unspecified hyperlipidemia type  Check fasting lipids.  Ensure she is on statin before.  - rosuvastatin (CRESTOR) 10 MG tablet  Dispense: 90 tablet; Refill: 3  - Lipid panel reflex to direct LDL Non-fasting  - ALT    Essential hypertension  Good control.  Continue current medication.  - hydrochlorothiazide (HYDRODIURIL) 25 MG tablet  Dispense: 90 tablet; Refill: 3  - Basic metabolic panel  (Ca, Cl, CO2, Creat, Gluc, K, Na, BUN)    Low bone mass  Discussed calcium and vitamin D intake.  Will prescribe as below.  - calcium carbonate-vitamin D (OSCAL) 500-5 MG-MCG tablet  Dispense: 180 tablet; Refill: 3       BMI:   Estimated body mass index is 33.55 kg/m  as calculated from the following:    Height as of this encounter: 1.48 m (4' 10.27\").    Weight as of this encounter: 73.5 kg (162 lb).     Return in about 3 months (around 2/28/2023) for Follow up.    Temo Gonzalez MD  [facility]    She declined covid vaccine.    Cash Bagley is a 82 year old accompanied by her self, presenting for the following health issues:  Medication Request      History of Present Illness       Reason for visit:  Med refill    She eats 2-3 servings of fruits and vegetables daily.She consumes 3 sweetened beverage(s) daily.She exercises with enough effort to increase her heart rate 9 or less minutes per day.  She exercises with enough effort to increase her heart rate 3 or less days per week. She is missing 1 dose(s) of medications per week.  She is not taking prescribed medications regularly due to other.       Hyperlipidemia Follow-Up      Are you regularly taking any medication or supplement to lower your cholesterol?   Yes- crestor    Are you having muscle aches or other side effects that you think could be caused by your cholesterol lowering medication?  No    Hypertension Follow-up      Do you check your blood pressure regularly outside of the clinic? No     Are you " "following a low salt diet? Yes    Are your blood pressures ever more than 140 on the top number (systolic) OR more   than 90 on the bottom number (diastolic), for example 140/90? No                 Objective    /75 (BP Location: Left arm, Patient Position: Left side, Cuff Size: Adult Large)   Pulse 71   Temp 97.1  F (36.2  C) (Temporal)   Resp 18   Ht 1.48 m (4' 10.27\")   Wt 73.5 kg (162 lb)   SpO2 99%   BMI 33.55 kg/m    Body mass index is 33.55 kg/m .  Physical Exam   GENERAL: alert, not distressed  CHEST: clear, no rales, rhonchi, or wheezes  CARDIAC: regular without murmur, gallop, or rub  ABDOMEN: soft, non tender, non distended, normal bowel sounds    Results for orders placed or performed in visit on 11/30/22   Basic metabolic panel  (Ca, Cl, CO2, Creat, Gluc, K, Na, BUN)     Status: Abnormal   Result Value Ref Range    Sodium 138 136 - 145 mmol/L    Potassium 3.3 (L) 3.4 - 5.3 mmol/L    Chloride 95 (L) 98 - 107 mmol/L    Carbon Dioxide (CO2) 27 22 - 29 mmol/L    Anion Gap 16 (H) 7 - 15 mmol/L    Urea Nitrogen 19.3 8.0 - 23.0 mg/dL    Creatinine 0.83 0.51 - 0.95 mg/dL    Calcium 9.9 8.8 - 10.2 mg/dL    Glucose 88 70 - 99 mg/dL    GFR Estimate 70 >60 mL/min/1.73m2   Lipid panel reflex to direct LDL Non-fasting     Status: Abnormal   Result Value Ref Range    Cholesterol 243 (H) <200 mg/dL    Triglycerides 182 (H) <150 mg/dL    Direct Measure HDL 84 >=50 mg/dL    LDL Cholesterol Calculated 123 (H) <=100 mg/dL    Non HDL Cholesterol 159 (H) <130 mg/dL    Narrative    Cholesterol  Desirable:  <200 mg/dL    Triglycerides  Normal:  Less than 150 mg/dL  Borderline High:  150-199 mg/dL  High:  200-499 mg/dL  Very High:  Greater than or equal to 500 mg/dL    Direct Measure HDL  Female:  Greater than or equal to 50 mg/dL   Male:  Greater than or equal to 40 mg/dL    LDL Cholesterol  Desirable:  <100mg/dL  Above Desirable:  100-129 mg/dL   Borderline High:  130-159 mg/dL   High:  160-189 mg/dL   Very High:  " >= 190 mg/dL    Non HDL Cholesterol  Desirable:  130 mg/dL  Above Desirable:  130-159 mg/dL  Borderline High:  160-189 mg/dL  High:  190-219 mg/dL  Very High:  Greater than or equal to 220 mg/dL   ALT     Status: Normal   Result Value Ref Range    ALT 23 10 - 35 U/L

## 2022-12-06 ENCOUNTER — TELEPHONE (OUTPATIENT)
Dept: FAMILY MEDICINE | Facility: CLINIC | Age: 82
End: 2022-12-06

## 2022-12-06 NOTE — TELEPHONE ENCOUNTER
----- Message from Temo Gonzalez MD sent at 12/2/2022  8:21 AM CST -----  Call:  Potassium is a little bit low.  The blood pressure medicine is most likely causing this.  Make sure you are taking the potassium supplement.  If you cannot, we might want to change blood pressure medicines.  Cholesterol a bit high but it was a nonfasting sample.

## 2022-12-06 NOTE — TELEPHONE ENCOUNTER
Left message x 1. Please review message thread below and advise the patient as indicated. Please schedule if necessary or indicated in message thread.  Use  line to contact patient :Renu

## 2022-12-06 NOTE — LETTER
December 19, 2022      Yudi Fragoso  [address]   [place] [address]        Dear ,    We are writing to inform you of your test results.    Potassium is a little bit low.  The blood pressure medicine is most likely causing this.  Make sure you are taking the potassium supplement.  If you cannot, we might want to change blood pressure medicines.  Cholesterol a bit high but it was a nonfasting sample.    Resulted Orders   Basic metabolic panel  (Ca, Cl, CO2, Creat, Gluc, K, Na, BUN)   Result Value Ref Range    Sodium 138 136 - 145 mmol/L    Potassium 3.3 (L) 3.4 - 5.3 mmol/L    Chloride 95 (L) 98 - 107 mmol/L    Carbon Dioxide (CO2) 27 22 - 29 mmol/L    Anion Gap 16 (H) 7 - 15 mmol/L    Urea Nitrogen 19.3 8.0 - 23.0 mg/dL    Creatinine 0.83 0.51 - 0.95 mg/dL    Calcium 9.9 8.8 - 10.2 mg/dL    Glucose 88 70 - 99 mg/dL    GFR Estimate 70 >60 mL/min/1.73m2      Comment:      Effective December 21, 2021 eGFRcr in adults is calculated using the 2021 CKD-EPI creatinine equation which includes age and gender (Re et al., NEJM, DOI: 10.1056/[id number])   Lipid panel reflex to direct LDL Non-fasting   Result Value Ref Range    Cholesterol 243 (H) <200 mg/dL    Triglycerides 182 (H) <150 mg/dL    Direct Measure HDL 84 >=50 mg/dL    LDL Cholesterol Calculated 123 (H) <=100 mg/dL    Non HDL Cholesterol 159 (H) <130 mg/dL    Narrative    Cholesterol  Desirable:  <200 mg/dL    Triglycerides  Normal:  Less than 150 mg/dL  Borderline High:  150-199 mg/dL  High:  200-499 mg/dL  Very High:  Greater than or equal to 500 mg/dL    Direct Measure HDL  Female:  Greater than or equal to 50 mg/dL   Male:  Greater than or equal to 40 mg/dL    LDL Cholesterol  Desirable:  <100mg/dL  Above Desirable:  100-129 mg/dL   Borderline High:  130-159 mg/dL   High:  160-189 mg/dL   Very High:  >= 190 mg/dL    Non HDL Cholesterol  Desirable:  130 mg/dL  Above Desirable:  130-159 mg/dL  Borderline High:  160-189  mg/dL  High:  190-219 mg/dL  Very High:  Greater than or equal to 220 mg/dL   ALT   Result Value Ref Range    ALT 23 10 - 35 U/L       If you have any questions or concerns, please call the clinic at the number listed above.       Sincerely,      Dr. Gonzalez

## 2023-02-28 ENCOUNTER — PATIENT OUTREACH (OUTPATIENT)
Dept: GERIATRIC MEDICINE | Facility: CLINIC | Age: 83
End: 2023-02-28
Payer: COMMERCIAL

## 2023-02-28 NOTE — PROGRESS NOTES
Encounter opened due to Regulatory [employer] Update to close [facility] Program.    Grabiel Noriega  Care Management Specialist  [facility]  [phone]

## 2023-02-28 NOTE — PROGRESS NOTES
Encounter opened due to Regulatory [employer] Update to open FVP Program.    Grabiel Noriega  Care Management Specialist  [facility]  [phone]

## 2023-04-03 ENCOUNTER — PATIENT OUTREACH (OUTPATIENT)
Dept: GERIATRIC MEDICINE | Facility: CLINIC | Age: 83
End: 2023-04-03
Payer: COMMERCIAL

## 2023-04-04 NOTE — PROGRESS NOTES
Writer contacted member and scheduled annual assessment for 3-30-23.    Bisi Perdomo RN  [facility]  [phone]

## 2023-04-04 NOTE — PROGRESS NOTES
[facility] Home Visit Assessment     Home visit for Health Risk Assessment with Yudi Fragoso completed on March 30, 2023    Type of residence:: Apartment - handicap accessible  Current living arrangement:: I live alone     Assessment completed with:: Patient, Care Team Member    Current Care Plan  Member currently receiving the following home care services:     Member currently receiving the following community resources: Day Care, Transportation Services       Medication Review  Medication reconciliation completed in Epic: Yes  Medication set-up & administration: Independent-does not set up.  Self-administers medications.  Medication Risk Assessment Medication (1 or more, place referral to MTM): N/A: No risk factors identified  MTM Referral Placed: No: No risk factors idenified    Mental/Behavioral Health   Depression Screening:   PHQ-2 Total Score (Adult) - Positive if 3 or more points; Administer PHQ-9 if positive: 0       Mental health DX:: No        Falls Assessment:   Fallen 2 or more times in the past year?: Yes   Any fall with injury in the past year?: No    ADL/IADL Dependencies:   Dependent ADLs:: Eating  Dependent IADLs:: Cooking, Meal Preparation, Money Management, Laundry    [facility] [place] [facility] Plan sponsored benefits: Shared information re: Silver Sneakers/gym memberships, ASA, Calcium +D.    PCA Assessment completed at visit: Not Applicable     Elderly Waiver Eligibility: Yes-will continue on EW    Care Plan & Recommendations: Per POC.    See [facility]CC for detailed assessment information.    Follow-Up Plan: Member informed of future contact, plan to f/u with member with a 6 month telephone assessment.  Contact information shared with member and family, encouraged member to call with any questions or concerns at any time.    [facility] care continuum providers: Please see Snapshot and Care Management Flowsheets for Specific details of care plan.    This CC note routed to PCP.    Bisi Perdomo RN  [facility]  [phone]

## 2023-04-19 ENCOUNTER — PATIENT OUTREACH (OUTPATIENT)
Dept: GERIATRIC MEDICINE | Facility: CLINIC | Age: 83
End: 2023-04-19
Payer: COMMERCIAL

## 2023-04-19 NOTE — LETTER
April 19, 2023      YUDI MEJIA  [address]   [place] [address]      Dear Yudi:    At [facility], we re dedicated to improving your health and wellness. Enclosed is the Care Plan developed with you on 3/30/23. Please review the Care Plan carefully.    As a reminder, during your visit we talked about:  Ways to manage your physical and mental health  Using health care to maintain and improve your health   Your preventive care needs     Remember to contact your care coordinator if you:  Are hospitalized, or plan to be hospitalized   Have a fall    Have a change in your physical or mental health  Need help finding support or services    If you have questions, or don t agree with your Care Plan, call me at [phone]. You can also call me if your needs change. TTY users, call the [place] Relay at (809) or [phone] (ejcsvk-dw-urkvvq relay service).    Sincerely,    Bisi Perdomo RN  [phone]  [email or web address] ([facility]) is a health plan that contracts with both Medicare and the [place] Medical Assistance (Medicaid) program to provide benefits of both programs to enrollees. Enrollment in [facility] depends on contract renewal.    [id number] accepted    [id number] (07/2022)

## 2023-04-19 NOTE — PROGRESS NOTES
[facility] Care Coordination Contact    Received after visit chart from care coordinator.  Completed following tasks: Mailed copy of care plan to client.  Updated services in Database, Submitted referrals/auths for ADC with [facility].   Mailed Safe Medication Disposal.    Provider Signature - No POC Shared:  Member indicates that they do not want their POC shared with any EW providers.    Grabiel Noriega  Care Management Specialist  [facility]  [phone]

## 2023-05-11 ENCOUNTER — MEDICAL CORRESPONDENCE (OUTPATIENT)
Dept: HEALTH INFORMATION MANAGEMENT | Facility: CLINIC | Age: 83
End: 2023-05-11

## 2023-08-01 ENCOUNTER — OFFICE VISIT (OUTPATIENT)
Dept: FAMILY MEDICINE | Facility: CLINIC | Age: 83
End: 2023-08-01
Payer: COMMERCIAL

## 2023-08-01 VITALS
OXYGEN SATURATION: 96 % | HEART RATE: 60 BPM | TEMPERATURE: 97.2 F | SYSTOLIC BLOOD PRESSURE: 125 MMHG | WEIGHT: 166 LBS | BODY MASS INDEX: 34.85 KG/M2 | HEIGHT: 58 IN | DIASTOLIC BLOOD PRESSURE: 70 MMHG | RESPIRATION RATE: 16 BRPM

## 2023-08-01 DIAGNOSIS — I10 ESSENTIAL HYPERTENSION: ICD-10-CM

## 2023-08-01 DIAGNOSIS — Z00.00 ENCOUNTER FOR MEDICARE ANNUAL WELLNESS EXAM: Primary | ICD-10-CM

## 2023-08-01 DIAGNOSIS — M85.80 LOW BONE MASS: ICD-10-CM

## 2023-08-01 DIAGNOSIS — E87.6 HYPOKALEMIA: ICD-10-CM

## 2023-08-01 DIAGNOSIS — E78.5 HYPERLIPIDEMIA, UNSPECIFIED HYPERLIPIDEMIA TYPE: ICD-10-CM

## 2023-08-01 LAB
ANION GAP SERPL CALCULATED.3IONS-SCNC: 11 MMOL/L (ref 7–15)
BUN SERPL-MCNC: 15.6 MG/DL (ref 8–23)
CALCIUM SERPL-MCNC: 10 MG/DL (ref 8.8–10.2)
CHLORIDE SERPL-SCNC: 101 MMOL/L (ref 98–107)
CHOLEST SERPL-MCNC: 230 MG/DL
CREAT SERPL-MCNC: 0.85 MG/DL (ref 0.51–0.95)
DEPRECATED HCO3 PLAS-SCNC: 29 MMOL/L (ref 22–29)
GFR SERPL CREATININE-BSD FRML MDRD: 68 ML/MIN/1.73M2
GLUCOSE SERPL-MCNC: 84 MG/DL (ref 70–99)
HDLC SERPL-MCNC: 74 MG/DL
LDLC SERPL CALC-MCNC: 132 MG/DL
NONHDLC SERPL-MCNC: 156 MG/DL
POTASSIUM SERPL-SCNC: 3.9 MMOL/L (ref 3.4–5.3)
SODIUM SERPL-SCNC: 141 MMOL/L (ref 136–145)
TRIGL SERPL-MCNC: 118 MG/DL

## 2023-08-01 PROCEDURE — 36415 COLL VENOUS BLD VENIPUNCTURE: CPT | Performed by: FAMILY MEDICINE

## 2023-08-01 PROCEDURE — 99213 OFFICE O/P EST LOW 20 MIN: CPT | Mod: 25 | Performed by: FAMILY MEDICINE

## 2023-08-01 PROCEDURE — 99397 PER PM REEVAL EST PAT 65+ YR: CPT | Performed by: FAMILY MEDICINE

## 2023-08-01 PROCEDURE — 80048 BASIC METABOLIC PNL TOTAL CA: CPT | Performed by: FAMILY MEDICINE

## 2023-08-01 PROCEDURE — 80061 LIPID PANEL: CPT | Performed by: FAMILY MEDICINE

## 2023-08-01 RX ORDER — ROSUVASTATIN CALCIUM 10 MG/1
10 TABLET, COATED ORAL DAILY
Qty: 90 TABLET | Refills: 3 | Status: SHIPPED | OUTPATIENT
Start: 2023-08-01 | End: 2023-08-15

## 2023-08-01 RX ORDER — POTASSIUM CHLORIDE 1500 MG/1
20 TABLET, EXTENDED RELEASE ORAL DAILY
Qty: 90 TABLET | Refills: 3 | Status: SHIPPED | OUTPATIENT
Start: 2023-08-01 | End: 2024-08-08

## 2023-08-01 RX ORDER — ACETAMINOPHEN 500 MG
500-1000 TABLET ORAL EVERY 6 HOURS PRN
Qty: 60 TABLET | Refills: 3 | Status: SHIPPED | OUTPATIENT
Start: 2023-08-01 | End: 2024-08-08

## 2023-08-01 RX ORDER — HYDROCHLOROTHIAZIDE 25 MG/1
25 TABLET ORAL DAILY
Qty: 90 TABLET | Refills: 3 | Status: SHIPPED | OUTPATIENT
Start: 2023-08-01 | End: 2024-08-08

## 2023-08-01 ASSESSMENT — ENCOUNTER SYMPTOMS
CHILLS: 0
ABDOMINAL PAIN: 0
JOINT SWELLING: 0
DIARRHEA: 0
SORE THROAT: 0
FEVER: 0
WEAKNESS: 0
EYE PAIN: 0
CONSTIPATION: 0
ARTHRALGIAS: 1
HEARTBURN: 0
MYALGIAS: 1
DYSURIA: 0
SHORTNESS OF BREATH: 0
PARESTHESIAS: 0
PALPITATIONS: 0
HEADACHES: 1
NAUSEA: 0
COUGH: 0
DIZZINESS: 0
HEMATURIA: 0
NERVOUS/ANXIOUS: 0
FREQUENCY: 0
HEMATOCHEZIA: 0
BREAST MASS: 0

## 2023-08-01 ASSESSMENT — ACTIVITIES OF DAILY LIVING (ADL)
CURRENT_FUNCTION: SHOPPING REQUIRES ASSISTANCE
CURRENT_FUNCTION: TRANSPORTATION REQUIRES ASSISTANCE

## 2023-08-01 NOTE — PROGRESS NOTES
"SUBJECTIVE:   Yudi is a 82 year old who presents for Preventive Visit.      8/1/2023     6:57 AM   Additional Questions   Roomed by Polita   Accompanied by self     Are you in the first 12 months of your Medicare coverage?  No    Healthy Habits:     In general, how would you rate your overall health?  Good    Frequency of exercise:  2-3 days/week    Duration of exercise:  45-60 minutes    Do you usually eat at least 4 servings of fruit and vegetables a day, include whole grains    & fiber and avoid regularly eating high fat or \"junk\" foods?  No    Taking medications regularly:  Yes    Medication side effects:  None    Ability to successfully perform activities of daily living:  Transportation requires assistance and shopping requires assistance    Home Safety:  No safety concerns identified    Hearing Impairment:  No hearing concerns    In the past 6 months, have you been bothered by leaking of urine?  No    In general, how would you rate your overall mental or emotional health?  Good    Additional concerns today:  No    Have you ever done Advance Care Planning? (For example, a Health Directive, POLST, or a discussion with a medical provider or your loved ones about your wishes): No, advance care planning information given to patient to review.  Patient declined advance care planning discussion at this time.    Cites her son as decision maker if needed.    Fall risk  Fallen 2 or more times in the past year?: No  Any fall with injury in the past year?: No  click delete button to remove this line now  Cognitive Screening   1) Repeat 3 items (Banana, Sunrise, Chair)      2) Clock draw: ABNORMAL    3) 3 item recall: Recalls 2 objects   Results: ABNORMAL clock, 1-2 items recalled: PROBABLE COGNITIVE IMPAIRMENT, **INFORM PROVIDER**    Mini-CogTM Copyright SANDY Ramirez. Licensed by the author for use in [facility]; reprinted with permission ([email or web address]). All rights reserved.      Do you have sleep apnea, " excessive snoring or daytime drowsiness? : no    Reviewed and updated as needed this visit by clinical staff   Tobacco  Allergies  Meds  Problems  Med Hx  Surg Hx  Fam Hx          Reviewed and updated as needed this visit by Provider   Tobacco  Allergies  Meds  Problems  Med Hx  Surg Hx  Fam Hx         Social History     Tobacco Use     Smoking status: Never     Smokeless tobacco: Never     Tobacco comments:     no second hand smoke exposure   Substance Use Topics     Alcohol use: Not on file             8/1/2023     7:02 AM   Alcohol Use   Prescreen: >3 drinks/day or >7 drinks/week? No     Do you have a current opioid prescription? No  Do you use any other controlled substances or medications that are not prescribed by a provider? None    Hyperlipidemia Follow-Up    Are you regularly taking any medication or supplement to lower your cholesterol?   Yes- rosuvastatin  Are you having muscle aches or other side effects that you think could be caused by your cholesterol lowering medication?  No    Hypertension Follow-up    Do you check your blood pressure regularly outside of the clinic? Yes   Are you following a low salt diet? Yes  Are your blood pressures ever more than 140 on the top number (systolic) OR more   than 90 on the bottom number (diastolic), for example 140/90? No    Current providers sharing in care for this patient include:   Patient Care Team:  Temo Gonzalez MD as PCP - General (Family Practice)  Bisi Perdomo, RN as Lead Care Coordinator (Primary Care - CC)  Temo Gonzalez MD as Assigned PCP    The following health maintenance items are reviewed in Epic and correct as of today:  Health Maintenance   Topic Date Due     COVID-19 Vaccine (5 - Moderna series) 05/27/2022     INFLUENZA VACCINE (1) 09/01/2023     MEDICARE ANNUAL WELLNESS VISIT  08/01/2024     ANNUAL REVIEW OF HM ORDERS  08/01/2024     FALL RISK ASSESSMENT  08/01/2024     ADVANCE CARE PLANNING  08/01/2028     DTAP/TDAP/TD  IMMUNIZATION (3 - Td or Tdap) 09/24/2029     DEXA  06/18/2034     PHQ-2 (once per calendar year)  Completed     Pneumococcal Vaccine: 65+ Years  Completed     ZOSTER IMMUNIZATION  Completed     IPV IMMUNIZATION  Aged Out     MENINGITIS IMMUNIZATION  Aged Out     BP Readings from Last 3 Encounters:   08/01/23 125/70   11/30/22 121/75   06/27/22 138/70    Wt Readings from Last 3 Encounters:   08/01/23 75.3 kg (166 lb)   11/30/22 73.5 kg (162 lb)   06/27/22 73.9 kg (163 lb)          Patient Active Problem List   Diagnosis     Hyperlipidemia     Myalgia And Myositis     Tingling (Paresthesia)     Essential hypertension     Disturbance Of Gait     Urinary Incontinence     Chronic bilateral low back pain without sciatica     Headache     Low bone mass     History reviewed. No pertinent surgical history.    Social History     Tobacco Use     Smoking status: Never     Smokeless tobacco: Never     Tobacco comments:     no second hand smoke exposure   Substance Use Topics     Alcohol use: Not on file     Family History   Problem Relation Age of Onset     No Known Problems Son          Current Outpatient Medications   Medication Sig Dispense Refill     acetaminophen (TYLENOL) 500 MG tablet Take 1-2 tablets (500-1,000 mg) by mouth every 6 hours as needed for mild pain 60 tablet 3     calcium carbonate-vitamin D (OSCAL) 500-5 MG-MCG tablet Take 1 tablet by mouth 2 times daily 180 tablet 3     hydrochlorothiazide (HYDRODIURIL) 25 MG tablet Take 1 tablet (25 mg) by mouth daily 90 tablet 3     potassium chloride ER (KLOR-CON M) 20 MEQ CR tablet Take 1 tablet (20 mEq) by mouth daily 90 tablet 3     rosuvastatin (CRESTOR) 10 MG tablet Take 1 tablet (10 mg) by mouth daily 90 tablet 3     No Known Allergies  Recent Labs   Lab Test 11/30/22  0934 06/27/22  0945 04/21/22  0713 10/19/21  0856 01/05/21  0745 01/05/21  0745 06/06/19  1047   *  --  89 124  --  110 144*   HDL 84  --  60 68  --  70 59   TRIG 182*  --  167* 123  --  143  "118   ALT 23  --  19 21  --  28 17   CR 0.83 0.70 0.71 0.82  --  0.78 0.67   GFRESTIMATED 70 87 85 68   < > >60 >60   GFRESTBLACK  --   --   --   --   --  >60 >60   POTASSIUM 3.3* 4.0 3.0* 3.8  --  4.4 4.2   TSH  --   --   --   --   --  2.54 1.60    < > = values in this interval not displayed.        Mammogram Screening - Patient over age 75, has elected to discontinue screenings.  Pertinent mammograms are reviewed under the imaging tab.    Review of Systems   Constitutional:  Negative for chills and fever.   HENT:  Negative for congestion, ear pain, hearing loss and sore throat.    Eyes:  Negative for pain and visual disturbance.   Respiratory:  Negative for cough and shortness of breath.    Cardiovascular:  Negative for chest pain, palpitations and peripheral edema.   Gastrointestinal:  Negative for abdominal pain, constipation, diarrhea, heartburn, hematochezia and nausea.   Breasts:  Negative for tenderness, breast mass and discharge.   Genitourinary:  Negative for dysuria, frequency, genital sores, hematuria, pelvic pain, urgency, vaginal bleeding and vaginal discharge.   Musculoskeletal:  Positive for arthralgias and myalgias. Negative for joint swelling.   Skin:  Negative for rash.   Neurological:  Positive for headaches. Negative for dizziness, weakness and paresthesias.   Psychiatric/Behavioral:  Negative for mood changes. The patient is not nervous/anxious.      OBJECTIVE:   /70 (BP Location: Left arm, Patient Position: Sitting, Cuff Size: Adult Regular)   Pulse 60   Temp 97.2  F (36.2  C) (Temporal)   Resp 16   Ht 1.473 m (4' 10\")   Wt 75.3 kg (166 lb)   SpO2 96%   BMI 34.69 kg/m   Estimated body mass index is 34.69 kg/m  as calculated from the following:    Height as of this encounter: 1.473 m (4' 10\").    Weight as of this encounter: 75.3 kg (166 lb).  Physical Exam  Gen:   Alert, not distressed  Head:   Normocephalic, without obvious abnormality, atraumatic  Eyes:   PERRL, " conjunctiva/corneas clear, EOM's intact  Ears:   Normal tympanic membranes and external ear canals  Nose:    Mucosa normal, no drainage or sinus tenderness  Throat:    No erythema or exudates  Neck:    No adenopathy no nodules in thyroid, normal ROM  Lungs:    Clear to auscultation bilaterally, respirations unlabored  Chest wall:  No tenderness or deformity  Heart:     Regular, normal S1 and S2, no murmur, gallop or rub  Abdomen:  Soft, non-tender, normal bowel sounds, no masses, no organomegaly  Back:    Symmetric, no curvature, ROM normal, no CVA tenderness  Extremities:   Extremities normal, atraumatic, no cyanosis or edema  Skin:     Skin color, texture, turgor normal, no rashes or lesions  Lymph nodes:   Cervical and supraclavicular nodes normal  Neurologic:   CNII-XII intact.   DTRs normal and symmetric.  Symmetric strength and sensation.    Diagnostic Test Results:  Labs reviewed in Epic  No results found for this or any previous visit (from the past 24 hour(s)).    ASSESSMENT / PLAN:   Yudi was seen today for wellness visit.    Diagnoses and all orders for this visit:    Encounter for Medicare annual wellness exam  -     acetaminophen (TYLENOL) 500 MG tablet; Take 1-2 tablets (500-1,000 mg) by mouth every 6 hours as needed for mild pain    Essential hypertension  Controlled.  Continue management with medications and lifestyle management.  -     Basic metabolic panel  (Ca, Cl, CO2, Creat, Gluc, K, Na, BUN); Future  -     Lipid panel reflex to direct LDL Fasting; Future  -     hydrochlorothiazide (HYDRODIURIL) 25 MG tablet; Take 1 tablet (25 mg) by mouth daily  -     Basic metabolic panel  (Ca, Cl, CO2, Creat, Gluc, K, Na, BUN)  -     Lipid panel reflex to direct LDL Fasting    Low bone mass  Discussed potentially repeating DEXA scan.  Patient declined.  She would like to continue dietary supplement.  -     calcium carbonate-vitamin D (OSCAL) 500-5 MG-MCG tablet; Take 1 tablet by mouth 2 times  "daily    Hypokalemia  Likely related to hydrochlorothiazide.  Continue  -     potassium chloride ER (KLOR-CON M) 20 MEQ CR tablet; Take 1 tablet (20 mEq) by mouth daily    Hyperlipidemia, unspecified hyperlipidemia type  Checking lipids today.  -     rosuvastatin (CRESTOR) 10 MG tablet; Take 1 tablet (10 mg) by mouth daily    Other orders  -     REVIEW OF HEALTH MAINTENANCE PROTOCOL ORDERS  -     Cancel: COVID-19 BIVALENT 12+ (PFIZER)  -     PRIMARY CARE FOLLOW-UP SCHEDULING; Future        Patient has been advised of split billing requirements and indicates understanding: Yes      COUNSELING:  Reviewed preventive health counseling, as reflected in patient instructions       Regular exercise       Healthy diet/nutrition       Immunizations  Declined: Covid-19, will consider in the  fall             Osteoporosis prevention/bone health      BMI:   Estimated body mass index is 34.69 kg/m  as calculated from the following:    Height as of this encounter: 1.473 m (4' 10\").    Weight as of this encounter: 75.3 kg (166 lb).     She reports that she has never smoked. She has never used smokeless tobacco.      Appropriate preventive services were discussed with this patient, including applicable screening as appropriate for cardiovascular disease, diabetes, osteopenia/osteoporosis, and glaucoma.  As appropriate for age/gender, discussed screening for colorectal cancer, prostate cancer, breast cancer, and cervical cancer. Checklist reviewing preventive services available has been given to the patient.          Temo Gonzalez MD  [facility]    Identified Health Risks:  I have reviewed Opioid Use Disorder and Substance Use Disorder risk factors and made any needed referrals.     Prior to immunization administration, verified patients identity using patient s name and date of birth. Please see Immunization Activity for additional information.     Screening Questionnaire for Adult Immunization    Are you " sick today?   No   Do you have allergies to medications, food, a vaccine component or latex?   No   Have you ever had a serious reaction after receiving a vaccination?   No   Do you have a long-term health problem with heart, lung, kidney, or metabolic disease (e.g., diabetes), asthma, a blood disorder, no spleen, complement component deficiency, a cochlear implant, or a spinal fluid leak?  Are you on long-term aspirin therapy?   No   Do you have cancer, leukemia, HIV/AIDS, or any other immune system problem?   No   Do you have a parent, brother, or sister with an immune system problem?   No   In the past 3 months, have you taken medications that affect  your immune system, such as prednisone, other steroids, or anticancer drugs; drugs for the treatment of rheumatoid arthritis, Crohn s disease, or psoriasis; or have you had radiation treatments?   No   Have you had a seizure, or a brain or other nervous system problem?   No   During the past year, have you received a transfusion of blood or blood    products, or been given immune (gamma) globulin or antiviral drug?   No   For women: Are you pregnant or is there a chance you could become       pregnant during the next month?   No   Have you received any vaccinations in the past 4 weeks?   No     Immunization questionnaire answers were all negative.      Patient instructed to remain in clinic for 15 minutes afterwards, and to report any adverse reactions.     Screening performed by Kenneth Morse on 8/1/2023 at 7:11 AM.    The patient was counseled and encouraged to consider modifying their diet and eating habits. She was provided with information on recommended healthy diet options.  The patient reports that she has difficulty with activities of daily living.   She reports her needs are currently met.

## 2023-08-01 NOTE — PATIENT INSTRUCTIONS
Patient Education   Personalized Prevention Plan  You are due for the preventive services outlined below.  Your care team is available to assist you in scheduling these services.  If you have already completed any of these items, please share that information with your care team to update in your medical record.  Health Maintenance Due   Topic Date Due    COVID-19 Vaccine (5 - Moderna series) 05/27/2022    ANNUAL REVIEW OF HM ORDERS  10/19/2022     Learning About Dietary Guidelines  What are the Dietary Guidelines for Americans?     Dietary Guidelines for Americans provide tips for eating well and staying healthy. This helps reduce the risk for long-term (chronic) diseases.  These guidelines recommend that you:  Eat and drink the right amount for you. The U.S[place] government's food guide is called MyPlate. It can help you make your own well-balanced eating plan.  Try to balance your eating with your activity. This helps you stay at a healthy weight.  Drink alcohol in moderation, if at all.  Limit foods high in salt, saturated fat, trans fat, and added sugar.  These guidelines are from the U.S. Department of Agriculture and the U.S. Department of Health and Human Services. They are updated every 5 years.  What is MyPlate?  MyPlate is the U.S. government's food guide. It can help you make your own well-balanced eating plan. A balanced eating plan means that you eat enough, but not too much, and that your food gives you the nutrients you need to stay healthy.  MyPlate focuses on eating plenty of whole grains, fruits, and vegetables, and on limiting fat and sugar. It is available online at www.ChooseMyPlate.gov.  How can you get started?  If you're trying to eat healthier, you can slowly change your eating habits over time. You don't have to make big changes all at once. Start by adding one or two healthy foods to your meals each day.  Grains  Choose whole-grain breads and cereals and whole-wheat pasta and whole-grain  "crackers.  Vegetables  Eat a variety of vegetables every day. They have lots of nutrients and are part of a heart-healthy diet.  Fruits  Eat a variety of fruits every day. Fruits contain lots of nutrients. Choose fresh fruit instead of fruit juice.  Protein foods  Choose fish and lean poultry more often. Eat red meat and fried meats less often. Dried beans, tofu, and nuts are also good sources of protein.  Dairy  Choose low-fat or fat-free products from this food group. If you have problems digesting milk, try eating cheese or yogurt instead.  Fats and oils  Limit fats and oils if you're trying to cut calories. Choose healthy fats when you cook. These include canola oil and olive oil.  Where can you learn more?  Go to https://www.[employer].net/patiented  Enter D676 in the search box to learn more about \"Learning About Dietary Guidelines.\"  Current as of: March 1, 2023               Content Version: 13.7    6327-5491 Review Trackers.   Care instructions adapted under license by your healthcare professional. If you have questions about a medical condition or this instruction, always ask your healthcare professional. [employer], [employer] disclaims any warranty or liability for your use of this information.         "

## 2023-08-14 ENCOUNTER — TELEPHONE (OUTPATIENT)
Dept: FAMILY MEDICINE | Facility: CLINIC | Age: 83
End: 2023-08-14
Payer: COMMERCIAL

## 2023-08-14 DIAGNOSIS — E78.5 HYPERLIPIDEMIA, UNSPECIFIED HYPERLIPIDEMIA TYPE: ICD-10-CM

## 2023-08-14 NOTE — TELEPHONE ENCOUNTER
----- Message from Temo Gonzalez MD sent at 8/11/2023  3:36 PM CDT -----  Call:   Cholesterol still somewhat high even though you are taking a cholesterol medicine.  Should we increase the dose and watch for the numbers to go down?

## 2023-08-15 ENCOUNTER — APPOINTMENT (OUTPATIENT)
Dept: INTERPRETER SERVICES | Facility: CLINIC | Age: 83
End: 2023-08-15
Payer: COMMERCIAL

## 2023-08-15 RX ORDER — ROSUVASTATIN CALCIUM 20 MG/1
20 TABLET, COATED ORAL DAILY
Qty: 90 TABLET | Refills: 1 | Status: SHIPPED | OUTPATIENT
Start: 2023-08-15 | End: 2024-03-04

## 2023-08-15 NOTE — TELEPHONE ENCOUNTER
Contacted patient using a [employer]swift  and relayed message below from below from Dr Gonzalez.  Patient would like to increase dose of rosuvastatin (CRESTOR) 10 MG tablet     Message sent to Dr Gonzalez for new order to [facility].    Melia Fry RN  [facility]

## 2023-08-16 NOTE — TELEPHONE ENCOUNTER
Temo Gonzalez MD Lindahl, Linda A, RN16 hours ago (5:03 PM)     TP  Sent new rx for 20 mg crestor       No further action needed from triage.    LUCI cMduffieN, RN-[facility]

## 2023-09-29 ENCOUNTER — PATIENT OUTREACH (OUTPATIENT)
Dept: GERIATRIC MEDICINE | Facility: CLINIC | Age: 83
End: 2023-09-29
Payer: COMMERCIAL

## 2023-10-02 NOTE — PROGRESS NOTES
[facility] Care Coordination Contact      [facility] Six-Month Telephone Assessment    6 month telephone assessment completed on 9-29-23.    ER visits: No  Hospitalizations: No  TCU stays: No  Significant health status changes: no  Falls/Injuries: No  ADL/IADL changes: No  Changes in services: No    Caregiver Assessment follow up:  n/a    Goals: See POC in chart for goal progress documentation.       Will see member in 6 months for an annual health risk assessment.   Encouraged member to call CC with any questions or concerns in the meantime.     Bisi Perdomo RN  [facility]  [phone]

## 2024-02-21 ENCOUNTER — PATIENT OUTREACH (OUTPATIENT)
Dept: GERIATRIC MEDICINE | Facility: CLINIC | Age: 84
End: 2024-02-21
Payer: COMMERCIAL

## 2024-03-04 ENCOUNTER — OFFICE VISIT (OUTPATIENT)
Dept: FAMILY MEDICINE | Facility: CLINIC | Age: 84
End: 2024-03-04
Payer: COMMERCIAL

## 2024-03-04 VITALS
OXYGEN SATURATION: 97 % | SYSTOLIC BLOOD PRESSURE: 131 MMHG | RESPIRATION RATE: 18 BRPM | DIASTOLIC BLOOD PRESSURE: 80 MMHG | HEIGHT: 58 IN | BODY MASS INDEX: 36.31 KG/M2 | TEMPERATURE: 97.5 F | WEIGHT: 173 LBS | HEART RATE: 79 BPM

## 2024-03-04 DIAGNOSIS — M85.80 LOW BONE MASS: ICD-10-CM

## 2024-03-04 DIAGNOSIS — I10 ESSENTIAL HYPERTENSION: ICD-10-CM

## 2024-03-04 DIAGNOSIS — E55.9 VITAMIN D DEFICIENCY: ICD-10-CM

## 2024-03-04 DIAGNOSIS — G89.29 CHRONIC BILATERAL LOW BACK PAIN WITHOUT SCIATICA: Primary | ICD-10-CM

## 2024-03-04 DIAGNOSIS — E78.5 HYPERLIPIDEMIA, UNSPECIFIED HYPERLIPIDEMIA TYPE: ICD-10-CM

## 2024-03-04 DIAGNOSIS — M54.50 CHRONIC BILATERAL LOW BACK PAIN WITHOUT SCIATICA: Primary | ICD-10-CM

## 2024-03-04 LAB
ANION GAP SERPL CALCULATED.3IONS-SCNC: 13 MMOL/L (ref 7–15)
BUN SERPL-MCNC: 15.6 MG/DL (ref 8–23)
CALCIUM SERPL-MCNC: 10 MG/DL (ref 8.8–10.2)
CHLORIDE SERPL-SCNC: 98 MMOL/L (ref 98–107)
CHOLEST SERPL-MCNC: 193 MG/DL
CREAT SERPL-MCNC: 1.06 MG/DL (ref 0.51–0.95)
DEPRECATED HCO3 PLAS-SCNC: 28 MMOL/L (ref 22–29)
EGFRCR SERPLBLD CKD-EPI 2021: 52 ML/MIN/1.73M2
FASTING STATUS PATIENT QL REPORTED: YES
GLUCOSE SERPL-MCNC: 102 MG/DL (ref 70–99)
HBA1C MFR BLD: 6.2 % (ref 0–5.6)
HDLC SERPL-MCNC: 68 MG/DL
LDLC SERPL CALC-MCNC: 94 MG/DL
NONHDLC SERPL-MCNC: 125 MG/DL
POTASSIUM SERPL-SCNC: 3.4 MMOL/L (ref 3.4–5.3)
SODIUM SERPL-SCNC: 139 MMOL/L (ref 135–145)
TRIGL SERPL-MCNC: 155 MG/DL
TSH SERPL DL<=0.005 MIU/L-ACNC: 3.24 UIU/ML (ref 0.3–4.2)
VIT B12 SERPL-MCNC: 531 PG/ML (ref 232–1245)
VIT D+METAB SERPL-MCNC: 20 NG/ML (ref 20–50)

## 2024-03-04 PROCEDURE — 99214 OFFICE O/P EST MOD 30 MIN: CPT | Performed by: FAMILY MEDICINE

## 2024-03-04 PROCEDURE — 82607 VITAMIN B-12: CPT | Performed by: FAMILY MEDICINE

## 2024-03-04 PROCEDURE — 83036 HEMOGLOBIN GLYCOSYLATED A1C: CPT | Performed by: FAMILY MEDICINE

## 2024-03-04 PROCEDURE — 36415 COLL VENOUS BLD VENIPUNCTURE: CPT | Performed by: FAMILY MEDICINE

## 2024-03-04 PROCEDURE — 82306 VITAMIN D 25 HYDROXY: CPT | Performed by: FAMILY MEDICINE

## 2024-03-04 PROCEDURE — G2211 COMPLEX E/M VISIT ADD ON: HCPCS | Performed by: FAMILY MEDICINE

## 2024-03-04 PROCEDURE — 80061 LIPID PANEL: CPT | Performed by: FAMILY MEDICINE

## 2024-03-04 PROCEDURE — 84443 ASSAY THYROID STIM HORMONE: CPT | Performed by: FAMILY MEDICINE

## 2024-03-04 PROCEDURE — 80048 BASIC METABOLIC PNL TOTAL CA: CPT | Performed by: FAMILY MEDICINE

## 2024-03-04 RX ORDER — POLYETHYLENE GLYCOL 400 AND PROPYLENE GLYCOL 4; 3 MG/ML; MG/ML
SOLUTION/ DROPS OPHTHALMIC
COMMUNITY
Start: 2024-02-02

## 2024-03-04 RX ORDER — GABAPENTIN 100 MG/1
100 CAPSULE ORAL 3 TIMES DAILY
Qty: 90 CAPSULE | Refills: 3 | Status: SHIPPED | OUTPATIENT
Start: 2024-03-04 | End: 2024-08-08

## 2024-03-04 RX ORDER — ROSUVASTATIN CALCIUM 20 MG/1
20 TABLET, COATED ORAL DAILY
Qty: 90 TABLET | Refills: 1 | Status: SHIPPED | OUTPATIENT
Start: 2024-03-04 | End: 2024-08-08

## 2024-03-04 ASSESSMENT — ENCOUNTER SYMPTOMS: LEG PAIN: 1

## 2024-03-04 NOTE — PROGRESS NOTES
"  Assessment & Plan     Chronic bilateral low back pain without sciatica  Check for neuropathic pain causes with labs.  Continue treatment with ibuprofen and can increase frequency and dose of usage.  Discussed Neurontin for more nerve type pain.  Consider imaging if not improving after some of this.  - gabapentin (NEURONTIN) 100 MG capsule  Dispense: 90 capsule; Refill: 3  - Lipid panel reflex to direct LDL Fasting  - TSH with free T4 reflex  - Vitamin B12  - Hemoglobin A1c    Hyperlipidemia, unspecified hyperlipidemia type  Primary prevention with  - rosuvastatin (CRESTOR) 20 MG tablet  Dispense: 90 tablet; Refill: 1    Low bone mass  Discussed monitoring with excess scan.  Will need to be scheduled.  - DX Hip/Pelvis/Spine  - Vitamin D deficiency screening  - Vitamin D deficiency screening    Essential hypertension  Well-controlled today on hydrochlorothiazide.  Continue med and continue to monitor.  - Basic metabolic panel  (Ca, Cl, CO2, Creat, Gluc, K, Na, BUN)    The longitudinal plan of care for the diagnosis(es)/condition(s) as documented were addressed during this visit. Due to the added complexity in care, I will continue to support Yudi in the subsequent management and with ongoing continuity of care.     BMI  Estimated body mass index is 36.16 kg/m  as calculated from the following:    Height as of this encounter: 1.473 m (4' 10\").    Weight as of this encounter: 78.5 kg (173 lb).       Subjective   Yudi is a 83 year old, presenting for the following health issues:  Leg Pain (And lower back pain )      3/4/2024     7:43 AM   Additional Questions   Roomed by padma     History of Present Illness       Reason for visit:  Leg pain      Patient clarifies with  that pain is really more in her back.  Recently worsening but has been present for at least a year.  Radiates to right leg.  Some numbness and tingling into her right leg.  Feels some burning pain in foot.  No recent injury.  Has Tylenol but has " "not used very much.  No rapidly progressive pain.  History of urinary incontinence but nothing recently.    He requests a shower chair.  Had one previously but it was too big to use in her bathtub/shower.    History of low bone mass.  Due for follow-up DEXA scan.  Not currently treated with any antiresorptive medication.          Objective    /80 (BP Location: Left arm, Patient Position: Sitting, Cuff Size: Adult Regular)   Pulse 79   Temp 97.5  F (36.4  C) (Temporal)   Resp 18   Ht 1.473 m (4' 10\")   Wt 78.5 kg (173 lb)   SpO2 97%   BMI 36.16 kg/m    Body mass index is 36.16 kg/m .  Physical Exam   GENERAL: alert, not distressed  CHEST: clear, no rales, rhonchi, or wheezes  CARDIAC: regular without murmur, gallop, or rub  ABDOMEN: soft, non tender, non distended, normal bowel sounds  Back: No significant midline tenderness.  Mild tenderness paraspinous musculature bilaterally.  Straight leg raise negative bilaterally.  Reflexes 2 of 4 patellar and Achilles bilaterally.  Normal lower extremity strength.      Results for orders placed or performed in visit on 03/04/24 (from the past 24 hour(s))   Hemoglobin A1c   Result Value Ref Range    Hemoglobin A1C 6.2 (H) 0.0 - 5.6 %       Prior to immunization administration, verified patients identity using patient s name and date of birth. Please see Immunization Activity for additional information.     Screening Questionnaire for Adult Immunization    Are you sick today?   No   Do you have allergies to medications, food, a vaccine component or latex?   No   Have you ever had a serious reaction after receiving a vaccination?   No   Do you have a long-term health problem with heart, lung, kidney, or metabolic disease (e.g., diabetes), asthma, a blood disorder, no spleen, complement component deficiency, a cochlear implant, or a spinal fluid leak?  Are you on long-term aspirin therapy?   Yes   Do you have cancer, leukemia, HIV/AIDS, or any other immune system " problem?   No   Do you have a parent, brother, or sister with an immune system problem?   No   In the past 3 months, have you taken medications that affect  your immune system, such as prednisone, other steroids, or anticancer drugs; drugs for the treatment of rheumatoid arthritis, Crohn s disease, or psoriasis; or have you had radiation treatments?   No   Have you had a seizure, or a brain or other nervous system problem?   No   During the past year, have you received a transfusion of blood or blood    products, or been given immune (gamma) globulin or antiviral drug?   No   For women: Are you pregnant or is there a chance you could become       pregnant during the next month?   No   Have you received any vaccinations in the past 4 weeks?   No     Immunization questionnaire was positive for at least one answer.  Notified .      Patient instructed to remain in clinic for 15 minutes afterwards, and to report any adverse reactions.     Screening performed by Curtis Lopez MA on 3/4/2024 at 7:48 AM.      Signed Electronically by: Temo Gonzalez MD  DME (Durable Medical Equipment) Orders and Documentation  Orders Placed This Encounter   Procedures    Bath Seat/Shower Chair Order        The patient was assessed and it was determined the patient is in need of the following listed DME Supplies/Equipment. Please complete supporting documentation below to demonstrate medical necessity.

## 2024-03-05 ENCOUNTER — TELEPHONE (OUTPATIENT)
Dept: FAMILY MEDICINE | Facility: CLINIC | Age: 84
End: 2024-03-05
Payer: COMMERCIAL

## 2024-03-05 RX ORDER — CHOLECALCIFEROL (VITAMIN D3) 50 MCG
1 TABLET ORAL DAILY
Qty: 90 TABLET | Refills: 1 | Status: SHIPPED | OUTPATIENT
Start: 2024-03-05 | End: 2024-08-08

## 2024-03-05 NOTE — TELEPHONE ENCOUNTER
Left message x 1. Please review message thread below and advise the patient as indicated. Please schedule if necessary or indicated in message thread. Use  line to contact patient

## 2024-03-05 NOTE — TELEPHONE ENCOUNTER
----- Message from Temo Gonzalez MD sent at 3/5/2024 11:38 AM CST -----  Call:  Lab test look pretty good.  Vitamin D is on the low edge.  I would take a vitamin D supplement every day.  I will send a prescription for that.

## 2024-03-09 ENCOUNTER — TRANSFERRED RECORDS (OUTPATIENT)
Dept: HEALTH INFORMATION MANAGEMENT | Facility: CLINIC | Age: 84
End: 2024-03-09

## 2024-03-14 ENCOUNTER — PATIENT OUTREACH (OUTPATIENT)
Dept: GERIATRIC MEDICINE | Facility: CLINIC | Age: 84
End: 2024-03-14
Payer: COMMERCIAL

## 2024-03-14 NOTE — PROGRESS NOTES
[facility] Care Coordination Contact    Received after visit chart from care coordinator.  Completed following tasks: Mailed copy of care plan/support plan to member, Mailed Safe Medication Disposal , Submitted referrals/auths for ADC plus rides with [facility], and Updated services in Database   and Provider Signature - No POC Shared:  Member indicates that they do not want their POC shared with any EW providers.    Homemaking TBD.    Grabiel Noriega  Care Management Specialist  [facility]  [phone]

## 2024-03-14 NOTE — PROGRESS NOTES
[facility] Care Coordination Contact    [facility] Home Visit Assessment     Home visit for Health Risk Assessment with Yudi Fragoso completed on February 21, 2024    Type of residence:: Apartment - handicap accessible  Current living arrangement:: I live alone     Assessment completed with:: Patient, Care Team Member    Current Care Plan  Member currently receiving the following home care services:     Member currently receiving the following community resources: Day Care, Transportation Services       Medication Review  Medication reconciliation completed in Epic: Yes  Medication set-up & administration: Independent-does not set up.  Self-administers medications.  Medication Risk Assessment Medication (1 or more, place referral to MTM): N/A: No risk factors identified  MTM Referral Placed: No: No risk factors idenified    Mental/Behavioral Health   Depression Screening:   PHQ-2 Total Score (Adult) - Positive if 3 or more points; Administer PHQ-9 if positive: 0       Mental health DX:: No        Falls Assessment:   Fallen 2 or more times in the past year?: No   Any fall with injury in the past year?: No    ADL/IADL Dependencies:   Dependent ADLs:: Eating  Dependent IADLs:: Cooking, Meal Preparation, Money Management, Laundry    Health Plan sponsored benefits: [facility]: Shared information regarding [employer]. Reviewed preventative health screening and health plan supplemental benefits/incentives. Reviewed medication disposal form.    PCA Assessment completed at visit: Not Applicable     Elderly Waiver Eligibility: Yes-will continue on EW    Care Plan & Recommendations: Per POC.    See LTCC for detailed assessment information.    Follow-Up Plan: Member informed of future contact, plan to f/u with member with a 6 month telephone assessment.  Contact information shared with member and family, encouraged member to call with any questions or concerns at any time.    [facility]  providers: Please see Snapshot and Care Management Flowsheets for Specific details of care plan.    This CC note routed to PCP, Temo Gonzalez RN  [facility]  [phone]

## 2024-03-14 NOTE — LETTER
March 14, 2024      YUDI MEJIA  [address]   [place] [address]      Dear Yudi:    At [facility], we re dedicated to improving your health and wellness. Enclosed is the Care Plan developed with you on 02/21/24. Please review the Care Plan carefully.    As a reminder, during your visit we talked about:  Ways to manage your physical and mental health  Using health care to maintain and improve your health   Your preventive care needs     Remember to contact your care coordinator if you:  Are hospitalized, or plan to be hospitalized   Have a fall    Have a change in your physical or mental health  Need help finding support or services    If you have questions, or don t agree with your Care Plan, call me at [phone]. You can also call me if your needs change. TTY users, call the [place] Rel[employer] at (952) or 1-835.864.4134 (uqchjp-ae-dfbanl relay service).    Sincerely,    Bisi Perdomo RN  [phone]  [email or web address] ([facility]) is a health plan that contracts with both Medicare and the [place] Medical Assistance (Medicaid) program to provide benefits of both programs to enrollees. Enrollment in [facility] depends on contract renewal.    [id number] accepted    [id number] (07/2022)

## 2024-04-03 ENCOUNTER — PATIENT OUTREACH (OUTPATIENT)
Dept: GERIATRIC MEDICINE | Facility: CLINIC | Age: 84
End: 2024-04-03
Payer: COMMERCIAL

## 2024-04-03 NOTE — PROGRESS NOTES
[facility] Care Coordination Contact    CHW lvm for mbr to get updates on MA renewal. CHW contact info was provided.     ANABELLA Marks  [facility]  [phone]

## 2024-07-09 ENCOUNTER — PATIENT OUTREACH (OUTPATIENT)
Dept: CARE COORDINATION | Facility: CLINIC | Age: 84
End: 2024-07-09
Payer: COMMERCIAL

## 2024-07-17 ENCOUNTER — PATIENT OUTREACH (OUTPATIENT)
Dept: GERIATRIC MEDICINE | Facility: CLINIC | Age: 84
End: 2024-07-17
Payer: COMMERCIAL

## 2024-07-23 ENCOUNTER — PATIENT OUTREACH (OUTPATIENT)
Dept: CARE COORDINATION | Facility: CLINIC | Age: 84
End: 2024-07-23
Payer: COMMERCIAL

## 2024-07-24 NOTE — PROGRESS NOTES
Writer received email from member's adult-day-care ([facility]) asking if member's ELDERLY WAIVER was still active.  Writer [place] and saw that [place]'s Elderly Waiver has been inactive since 3-31-24.      Writer called [facility] financial worker ([facility] [phone]) and left voice mail asking for a return call in regards to member's Elderly Waiver status.  Writer also called [facility] financial worker (Magdal[place] [phone]) for a return call.    Bisi Perdomo RN  [facility]  [phone]

## 2024-07-25 ENCOUNTER — TELEPHONE (OUTPATIENT)
Dept: FAMILY MEDICINE | Facility: CLINIC | Age: 84
End: 2024-07-25
Payer: COMMERCIAL

## 2024-07-25 NOTE — TELEPHONE ENCOUNTER
Forms/Letter Request    Type of form/letter: OTHER: [facility]-Health Care Summary       Do we have the form/letter: Yes:     Who is the form from? [facility]-     Where did/will the form come from? form was faxed in    When is form/letter needed by: asap    How would you like the form/letter returned: Fax : [phone]

## 2024-08-07 NOTE — PROGRESS NOTES
Writer received voicemail from from [facility] ([facility] [phone]) stating that she looked on their computer software and it shows that member is open to ELDERLY WAIVER and end date is 3-31-25.  Lucas stated that writer can call member's financial worker, Rangel Read [phone] with questions.    Writer looked on [place] and it still shows that member is not open to Elderly Waiver.  Writer called Rangel and left a voice mail asking for a return call.  Writer also called Lucas again and left a voice mail for her, stating that member's Elderly Waiver still shows as inactive in [employer].    Bisi Perdomo RN  [facility]  [phone]

## 2024-08-08 ENCOUNTER — OFFICE VISIT (OUTPATIENT)
Dept: FAMILY MEDICINE | Facility: CLINIC | Age: 84
End: 2024-08-08
Payer: COMMERCIAL

## 2024-08-08 VITALS
DIASTOLIC BLOOD PRESSURE: 79 MMHG | OXYGEN SATURATION: 98 % | BODY MASS INDEX: 34.85 KG/M2 | RESPIRATION RATE: 16 BRPM | HEART RATE: 56 BPM | HEIGHT: 58 IN | TEMPERATURE: 97.9 F | SYSTOLIC BLOOD PRESSURE: 163 MMHG | WEIGHT: 166 LBS

## 2024-08-08 DIAGNOSIS — I10 ESSENTIAL HYPERTENSION: ICD-10-CM

## 2024-08-08 DIAGNOSIS — M85.80 LOW BONE MASS: ICD-10-CM

## 2024-08-08 DIAGNOSIS — G89.29 CHRONIC BILATERAL LOW BACK PAIN WITHOUT SCIATICA: ICD-10-CM

## 2024-08-08 DIAGNOSIS — M54.50 CHRONIC BILATERAL LOW BACK PAIN WITHOUT SCIATICA: ICD-10-CM

## 2024-08-08 DIAGNOSIS — E55.9 VITAMIN D DEFICIENCY: ICD-10-CM

## 2024-08-08 DIAGNOSIS — E78.5 HYPERLIPIDEMIA, UNSPECIFIED HYPERLIPIDEMIA TYPE: ICD-10-CM

## 2024-08-08 DIAGNOSIS — M79.10 MYALGIA: Primary | ICD-10-CM

## 2024-08-08 LAB
ALBUMIN SERPL BCG-MCNC: 4.3 G/DL (ref 3.5–5.2)
ALP SERPL-CCNC: 73 U/L (ref 40–150)
ALT SERPL W P-5'-P-CCNC: 22 U/L (ref 0–50)
ANION GAP SERPL CALCULATED.3IONS-SCNC: 8 MMOL/L (ref 7–15)
AST SERPL W P-5'-P-CCNC: 21 U/L (ref 0–45)
BILIRUB DIRECT SERPL-MCNC: <0.2 MG/DL (ref 0–0.3)
BILIRUB SERPL-MCNC: 0.6 MG/DL
BUN SERPL-MCNC: 15.9 MG/DL (ref 8–23)
CALCIUM SERPL-MCNC: 9.9 MG/DL (ref 8.8–10.4)
CHLORIDE SERPL-SCNC: 104 MMOL/L (ref 98–107)
CK SERPL-CCNC: 63 U/L (ref 26–192)
CREAT SERPL-MCNC: 0.79 MG/DL (ref 0.51–0.95)
CRP SERPL-MCNC: <3 MG/L
EGFRCR SERPLBLD CKD-EPI 2021: 74 ML/MIN/1.73M2
GLUCOSE SERPL-MCNC: 96 MG/DL (ref 70–99)
HCO3 SERPL-SCNC: 28 MMOL/L (ref 22–29)
POTASSIUM SERPL-SCNC: 4.5 MMOL/L (ref 3.4–5.3)
PROT SERPL-MCNC: 7 G/DL (ref 6.4–8.3)
RHEUMATOID FACT SERPL-ACNC: <10 IU/ML
SODIUM SERPL-SCNC: 140 MMOL/L (ref 135–145)
TSH SERPL DL<=0.005 MIU/L-ACNC: 1.03 UIU/ML (ref 0.3–4.2)
VIT D+METAB SERPL-MCNC: 32 NG/ML (ref 20–50)

## 2024-08-08 PROCEDURE — T1013 SIGN LANG/ORAL INTERPRETER: HCPCS | Mod: U3

## 2024-08-08 PROCEDURE — 36415 COLL VENOUS BLD VENIPUNCTURE: CPT | Performed by: FAMILY MEDICINE

## 2024-08-08 PROCEDURE — 82550 ASSAY OF CK (CPK): CPT | Performed by: FAMILY MEDICINE

## 2024-08-08 PROCEDURE — 86140 C-REACTIVE PROTEIN: CPT | Performed by: FAMILY MEDICINE

## 2024-08-08 PROCEDURE — 82306 VITAMIN D 25 HYDROXY: CPT | Performed by: FAMILY MEDICINE

## 2024-08-08 PROCEDURE — 90678 RSV VACC PREF BIVALENT IM: CPT | Performed by: FAMILY MEDICINE

## 2024-08-08 PROCEDURE — 90471 IMMUNIZATION ADMIN: CPT | Performed by: FAMILY MEDICINE

## 2024-08-08 PROCEDURE — 80053 COMPREHEN METABOLIC PANEL: CPT | Performed by: FAMILY MEDICINE

## 2024-08-08 PROCEDURE — 84443 ASSAY THYROID STIM HORMONE: CPT | Performed by: FAMILY MEDICINE

## 2024-08-08 PROCEDURE — 99214 OFFICE O/P EST MOD 30 MIN: CPT | Mod: 25 | Performed by: FAMILY MEDICINE

## 2024-08-08 PROCEDURE — 86431 RHEUMATOID FACTOR QUANT: CPT | Performed by: FAMILY MEDICINE

## 2024-08-08 PROCEDURE — 82248 BILIRUBIN DIRECT: CPT | Performed by: FAMILY MEDICINE

## 2024-08-08 RX ORDER — ROSUVASTATIN CALCIUM 20 MG/1
20 TABLET, COATED ORAL DAILY
Qty: 90 TABLET | Refills: 1 | Status: SHIPPED | OUTPATIENT
Start: 2024-08-08

## 2024-08-08 RX ORDER — LOSARTAN POTASSIUM AND HYDROCHLOROTHIAZIDE 12.5; 5 MG/1; MG/1
1 TABLET ORAL DAILY
Qty: 90 TABLET | Refills: 0 | Status: SHIPPED | OUTPATIENT
Start: 2024-08-08

## 2024-08-08 RX ORDER — CHOLECALCIFEROL (VITAMIN D3) 50 MCG
1 TABLET ORAL DAILY
Qty: 90 TABLET | Refills: 1 | Status: SHIPPED | OUTPATIENT
Start: 2024-08-08

## 2024-08-08 RX ORDER — GABAPENTIN 100 MG/1
100 CAPSULE ORAL 3 TIMES DAILY
Qty: 90 CAPSULE | Refills: 3 | Status: SHIPPED | OUTPATIENT
Start: 2024-08-08

## 2024-08-08 RX ORDER — ACETAMINOPHEN 500 MG
500-1000 TABLET ORAL EVERY 6 HOURS PRN
Qty: 60 TABLET | Refills: 3 | Status: SHIPPED | OUTPATIENT
Start: 2024-08-08

## 2024-08-08 NOTE — PROGRESS NOTES
"  Assessment & Plan     Myalgia  Widespread pains, question proximal muscle pain and weakness though pain seems to extend as well.  Workup PMR.  - CK total  - Basic metabolic panel  (Ca, Cl, CO2, Creat, Gluc, K, Na, BUN)  - CRP, inflammation  - TSH with free T4 reflex  - Hepatic panel (Albumin, ALT, AST, Bili, Alk Phos, TP)    Essential hypertension  Better control as needed.  Will have her stop hydrochlorothiazide and start the Hyzaar noted below.  2-week follow-up to monitor.  - losartan-hydrochlorothiazide (HYZAAR) 50-12.5 MG tablet  Dispense: 90 tablet; Refill: 0    Chronic bilateral low back pain without sciatica  Discussed physical therapy to help with back pain.  Continue current medicines of acetaminophen and gabapentin.  - gabapentin (NEURONTIN) 100 MG capsule  Dispense: 90 capsule; Refill: 3  - Physical Therapy  Referral    Hyperlipidemia, unspecified hyperlipidemia type  For now, continue statin.  Muscle workup as above.  - rosuvastatin (CRESTOR) 20 MG tablet  Dispense: 90 tablet; Refill: 1  - Primary Care - Care Coordination Referral    Low bone mass  Vitamin D deficiency  Recheck labs.  - vitamin D3 (CHOLECALCIFEROL) 50 mcg (2000 units) tablet  Dispense: 90 tablet; Refill: 01  - calcium carbonate-vitamin D (OSCAL) 500-5 MG-MCG tablet  Dispense: 180 tablet; Refill: 3  - Primary Care - Care Coordination Referral    Will tap care coordination team to ask for help and requesting PCA evaluation.      BMI  Estimated body mass index is 34.69 kg/m  as calculated from the following:    Height as of this encounter: 1.473 m (4' 10\").    Weight as of this encounter: 75.3 kg (166 lb).       Cash Bagley is a 84 year old, presenting for the following health issues:  Generalized Body Aches, Pain (Experiencing hot in her body and low back pain ), Hand/wrist Stiffness (Stiffness in finger, shoulder ), and Knee Pain (Make it hard to stand)        8/8/2024     9:00 AM   Additional Questions   Roomed by cristina " "  Accompanied by self     History of Present Illness       Reason for visit:  Stiffnes in finger and shoulder, body ach , low back pain, apply for pca    She eats 2-3 servings of fruits and vegetables daily.She consumes 1 sweetened beverage(s) daily.She exercises with enough effort to increase her heart rate 10 to 19 minutes per day.  She exercises with enough effort to increase her heart rate 3 or less days per week.   She is taking medications regularly.     84-year-old female here.  Many complaints of aches and pains in many places.  Generalized body aches.  Hands wrists waist.  Knees and fingers also somewhat painful.  Caroline back pain.  Worse with activities.  She does note stiffness in the morning that gets better after she gets moving.  Needs Tylenol refilled to manage pain.    Blood pressure noted to be elevated.  Has been on hydrochlorothiazide.  Reports she has been taking it.    Patient reports difficulty with work at home.  Has difficulty with housework and cleaning.  Difficulty with bathing.  Difficulty with meal prep, cutting, cooking etc.  Hopes to have some PCA benefit.  Would like further evaluation for that.        Objective    BP (!) 163/79 (BP Location: Right arm, Patient Position: Sitting, Cuff Size: Adult Regular)   Pulse 56   Temp 97.9  F (36.6  C) (Temporal)   Resp 16   Ht 1.473 m (4' 10\")   Wt 75.3 kg (166 lb)   LMP  (LMP Unknown)   SpO2 98%   BMI 34.69 kg/m    Body mass index is 34.69 kg/m .  Physical Exam   GENERAL: alert, not distressed  CHEST: clear, no rales, rhonchi, or wheezes  CARDIAC: regular without murmur, gallop, or rub  MS: No significant joint effusion or bogginess.  Gait is relatively normal.  Right small finger has a bit of PIP deviation.    No results found for this or any previous visit (from the past 24 hour(s)).        Signed Electronically by: Temo Gonzalez MD        Prior to immunization administration, verified patients identity using patient s name and date of " birth. Please see Immunization Activity for additional information.     Screening Questionnaire for Adult Immunization    Are you sick today?   No   Do you have allergies to medications, food, a vaccine component or latex?   No   Have you ever had a serious reaction after receiving a vaccination?   No   Do you have a long-term health problem with heart, lung, kidney, or metabolic disease (e.g., diabetes), asthma, a blood disorder, no spleen, complement component deficiency, a cochlear implant, or a spinal fluid leak?  Are you on long-term aspirin therapy?   No   Do you have cancer, leukemia, HIV/AIDS, or any other immune system problem?   No   Do you have a parent, brother, or sister with an immune system problem?   No   In the past 3 months, have you taken medications that affect  your immune system, such as prednisone, other steroids, or anticancer drugs; drugs for the treatment of rheumatoid arthritis, Crohn s disease, or psoriasis; or have you had radiation treatments?   No   Have you had a seizure, or a brain or other nervous system problem?   No   During the past year, have you received a transfusion of blood or blood    products, or been given immune (gamma) globulin or antiviral drug?   No   For women: Are you pregnant or is there a chance you could become       pregnant during the next month?   No   Have you received any vaccinations in the past 4 weeks?   No     Immunization questionnaire answers were all negative.      Patient instructed to remain in clinic for 15 minutes afterwards, and to report any adverse reactions.     Screening performed by Gail Campos MA on 8/8/2024 at 9:06 AM.

## 2024-08-09 ENCOUNTER — APPOINTMENT (OUTPATIENT)
Dept: INTERPRETER SERVICES | Facility: CLINIC | Age: 84
End: 2024-08-09
Payer: COMMERCIAL

## 2024-08-09 ENCOUNTER — TELEPHONE (OUTPATIENT)
Dept: FAMILY MEDICINE | Facility: CLINIC | Age: 84
End: 2024-08-09
Payer: COMMERCIAL

## 2024-08-09 NOTE — TELEPHONE ENCOUNTER
Spoke to son (CTC on file) and relayed clinician's message. Son verb understanding, has concerns for pt's declining health, wishes to discus concerns with clinician. Scheduled virtual appt for 9/16/24. Advised son, that pt would have to be present for virtual appt. Son verb understanding.     Andreina EDUARDO RN  [facility]      ----- Message from Temo Gonzalez sent at 8/9/2024  2:55 PM CDT -----  Call:  Liver kidney and blood sugar tests look good.  Thyroid good.  Nothing in labs suggest a reason for many areas of pain.

## 2024-08-13 NOTE — PROGRESS NOTES
Writer received voicemail from  ([facility]) stating that her records still show member's Elderly Waiver status as active.    Writer looked member up in [facility] and saw that member's Elderly Waiver is now active.  Writer emailed Naren Baker ([facility]) and informed her that member's Elderly Waiver is active again.    Bisi Perdomo RN  [facility]  [phone]

## 2024-08-26 ENCOUNTER — ALLIED HEALTH/NURSE VISIT (OUTPATIENT)
Dept: FAMILY MEDICINE | Facility: CLINIC | Age: 84
End: 2024-08-26
Payer: COMMERCIAL

## 2024-08-26 VITALS — DIASTOLIC BLOOD PRESSURE: 74 MMHG | SYSTOLIC BLOOD PRESSURE: 138 MMHG | HEART RATE: 78 BPM

## 2024-08-26 DIAGNOSIS — I10 ESSENTIAL HYPERTENSION: Primary | ICD-10-CM

## 2024-08-26 PROCEDURE — 99207 PR NO CHARGE NURSE ONLY: CPT

## 2024-08-26 NOTE — PROGRESS NOTES
Yudi Fragoso is a 84 year old year old patient who comes in today for a Blood Pressure check because of medication change.  Patient is taking medication as prescribed  Patient is tolerating medications well.  Patient is not monitoring Blood Pressure at home.  Current complaints: headaches. Took Tylenol which helps  Disposition:  Normal BP.  Message routed to Dr Gonzalez for review / plan    Melia Fry RN  [facility]    Essential hypertension    Dr Gonzalez  Better control as needed.  Will have her stop hydrochlorothiazide and start the Hyzaar noted below.  2-week follow-up to monitor.  - losartan-hydrochlorothiazide (HYZAAR) 50-12.5 MG tablet  Dispense: 90 tablet; Refill: 0    BP Readings from Last 3 Encounters:   08/26/24 138/74   08/08/24 (!) 163/79   03/04/24 131/80

## 2024-09-16 ENCOUNTER — VIRTUAL VISIT (OUTPATIENT)
Dept: FAMILY MEDICINE | Facility: CLINIC | Age: 84
End: 2024-09-16
Payer: COMMERCIAL

## 2024-09-16 DIAGNOSIS — I10 ESSENTIAL HYPERTENSION: Primary | ICD-10-CM

## 2024-09-16 PROCEDURE — 99442 PR PHYSICIAN TELEPHONE EVALUATION 11-20 MIN: CPT | Mod: 93 | Performed by: FAMILY MEDICINE

## 2024-09-16 NOTE — PROGRESS NOTES
"Yudi is a 84 year old who is being evaluated via a billable telephone visit.    What phone number would you like to be contacted at? [phone]  How would you like to obtain your AVS? Mail a copy  Originating Location (pt. Location): Home    Distant Location (provider location):  On-site    Assessment & Plan     Essential hypertension      Controlled  Tolerating new med.  Recheck labs 2-3 months    Discussed getting covid and flu vaccines.  Can get them at pharmacy.        BMI  Estimated body mass index is 34.69 kg/m  as calculated from the following:    Height as of 8/8/24: 1.473 m (4' 10\").    Weight as of 8/8/24: 75.3 kg (166 lb).       Subjective   Yudi is a 84 year old, presenting for the following health issues:  Follow Up, Hypertension, and Recheck Medication        9/16/2024     4:18 PM   Additional Questions   Roomed by hser   Accompanied by self     History of Present Illness       Hypertension: She presents for follow up of hypertension.  She does not check blood pressure  regularly outside of the clinic. Outside blood pressures have been over 140/90. She follows a low salt diet.     She eats 2-3 servings of fruits and vegetables daily.She consumes 1 sweetened beverage(s) daily.She exercises with enough effort to increase her heart rate 20 to 29 minutes per day.  She exercises with enough effort to increase her heart rate 3 or less days per week.   She is taking medications regularly.     Patient started telephone visit to discuss labs and blood pressure reading from her follow-up visit.  Taking lisinopril/hydrochlorothiazide without concern or problem.  No side effects noted.  She does not feel any different.  Blood pressure controlled when she followed up.  She reports home nurse has seen her and systolic pressure was 125 today.  She does not know diastolic.    She wanted to review all of her labs that were done the day she was in clinic.  We did that.  I suggested a follow-up visit for after 2 to 3 " months on new blood pressure medication for labs.    We also discussed flu and COVID vaccines.  She thought she had gotten them already.  We discussed that she had an RSV vaccine while in clinic.      Objective    Vitals - Patient Reported  Systolic (Patient Reported):  (unable to)  Diastolic (Patient Reported):  (unable to)  Weight (Patient Reported):  (unable to)  Height (Patient Reported):  (unable to)  SpO2 (Patient Reported):  (unable to)  Temperature (Patient Reported):  (unable to)  Pulse (Patient Reported):  (unable to)      Vitals:  No vitals were obtained today due to virtual visit.    Physical Exam   General: Alert and no distress //Respiratory: No audible wheeze, cough, or shortness of breath // Psychiatric:  Appropriate affect, tone, and pace of words            Phone call duration: 12 minutes  Signed Electronically by: Temo Gonzalez MD        Prior to immunization administration, verified patients identity using patient s name and date of birth. Please see Immunization Activity for additional information.     Screening Questionnaire for Adult Immunization    Are you sick today?   No   Do you have allergies to medications, food, a vaccine component or latex?   No   Have you ever had a serious reaction after receiving a vaccination?   No   Do you have a long-term health problem with heart, lung, kidney, or metabolic disease (e.g., diabetes), asthma, a blood disorder, no spleen, complement component deficiency, a cochlear implant, or a spinal fluid leak?  Are you on long-term aspirin therapy?   No   Do you have cancer, leukemia, HIV/AIDS, or any other immune system problem?   No   Do you have a parent, brother, or sister with an immune system problem?   No   In the past 3 months, have you taken medications that affect  your immune system, such as prednisone, other steroids, or anticancer drugs; drugs for the treatment of rheumatoid arthritis, Crohn s disease, or psoriasis; or have you had radiation  treatments?   No   Have you had a seizure, or a brain or other nervous system problem?   No   During the past year, have you received a transfusion of blood or blood    products, or been given immune (gamma) globulin or antiviral drug?   No   For women: Are you pregnant or is there a chance you could become       pregnant during the next month?   No   Have you received any vaccinations in the past 4 weeks?   No     Immunization questionnaire answers were all negative.      Patient instructed to remain in clinic for 15 minutes afterwards, and to report any adverse reactions.     Screening performed by Gail Campos MA on 9/16/2024 at 4:26 PM.

## 2024-10-01 NOTE — PROGRESS NOTES
[facility] Six-Month Telephone Assessment    6 month telephone assessment completed on 7-17-24.    ER visits: No  Hospitalizations: No  TCU stays: No  Significant health status changes: no  Falls/Injuries: No  ADL/IADL changes: No  Changes in services: No    Caregiver Assessment follow up:  n/a    Goals: See Support Plan for goal progress documentation.       Will see member in 6 months for an annual health risk assessment.   Encouraged member to call CC with any questions or concerns in the meantime.       Bisi Perdomo RN  [facility]  [phone]

## 2024-11-05 DIAGNOSIS — I10 ESSENTIAL HYPERTENSION: ICD-10-CM

## 2024-11-05 RX ORDER — LOSARTAN POTASSIUM AND HYDROCHLOROTHIAZIDE 12.5; 5 MG/1; MG/1
TABLET ORAL
Qty: 90 TABLET | Refills: 0 | Status: SHIPPED | OUTPATIENT
Start: 2024-11-05

## 2024-11-06 DIAGNOSIS — I10 ESSENTIAL HYPERTENSION: ICD-10-CM

## 2024-11-06 RX ORDER — LOSARTAN POTASSIUM AND HYDROCHLOROTHIAZIDE 12.5; 5 MG/1; MG/1
TABLET ORAL
Qty: 90 TABLET | Refills: 0 | OUTPATIENT
Start: 2024-11-06

## 2024-11-06 NOTE — TELEPHONE ENCOUNTER
Medication Question or Refill        What medication are you calling about (include dose and sig)?:     losartan-hydrochlorothiazide (HYZAAR) 50-12.5 MG tablet       Preferred Pharmacy:   [facility] - [place], [place] - [address] [place] [address]  [address]  [address]  [place] [address]  Phone: [phone] Fax: [phone]      Controlled Substance Agreement on file:   CSA -- Patient Level:    CSA: None found at the patient level.       Who prescribed the medication?: sweta    Do you need a refill? Yes    When did you use the medication last? 11/6/24    Patient offered an appointment? Yes: schedule already    Do you have any questions or concerns?  Yes: only have 2 day left need medication until next appt      Okay to leave a detailed message?: Yes at Cell number on file:    Telephone Information:   Mobile [phone]

## 2024-11-06 NOTE — TELEPHONE ENCOUNTER
Patient Returning Call    Reason for call:  want to know when the medication is sent to pharmacy     Information relayed to patient:      Patient has additional questions:  Yes    What are your questions/concerns:  above    Who does the patient want to speak with:      Is an  needed?:  Yes: malcolm      Okay to leave a detailed message?: Yes at Cell number on file:    Telephone Information:   Mobile [phone]

## 2024-12-18 ENCOUNTER — VIRTUAL VISIT (OUTPATIENT)
Dept: INTERPRETER SERVICES | Facility: CLINIC | Age: 84
End: 2024-12-18

## 2024-12-18 ENCOUNTER — OFFICE VISIT (OUTPATIENT)
Dept: FAMILY MEDICINE | Facility: CLINIC | Age: 84
End: 2024-12-18
Payer: COMMERCIAL

## 2024-12-18 VITALS
HEIGHT: 58 IN | BODY MASS INDEX: 33.8 KG/M2 | TEMPERATURE: 97.7 F | HEART RATE: 91 BPM | DIASTOLIC BLOOD PRESSURE: 76 MMHG | WEIGHT: 161 LBS | OXYGEN SATURATION: 97 % | RESPIRATION RATE: 20 BRPM | SYSTOLIC BLOOD PRESSURE: 127 MMHG

## 2024-12-18 DIAGNOSIS — M54.50 CHRONIC BILATERAL LOW BACK PAIN WITHOUT SCIATICA: ICD-10-CM

## 2024-12-18 DIAGNOSIS — R73.03 PREDIABETES: Primary | ICD-10-CM

## 2024-12-18 DIAGNOSIS — G89.29 CHRONIC BILATERAL LOW BACK PAIN WITHOUT SCIATICA: ICD-10-CM

## 2024-12-18 DIAGNOSIS — I10 ESSENTIAL HYPERTENSION: ICD-10-CM

## 2024-12-18 DIAGNOSIS — E78.5 HYPERLIPIDEMIA, UNSPECIFIED HYPERLIPIDEMIA TYPE: ICD-10-CM

## 2024-12-18 LAB
ANION GAP SERPL CALCULATED.3IONS-SCNC: 11 MMOL/L (ref 7–15)
BUN SERPL-MCNC: 12 MG/DL (ref 8–23)
CALCIUM SERPL-MCNC: 10 MG/DL (ref 8.8–10.4)
CHLORIDE SERPL-SCNC: 101 MMOL/L (ref 98–107)
CHOLEST SERPL-MCNC: 173 MG/DL
CREAT SERPL-MCNC: 0.77 MG/DL (ref 0.51–0.95)
EGFRCR SERPLBLD CKD-EPI 2021: 76 ML/MIN/1.73M2
EST. AVERAGE GLUCOSE BLD GHB EST-MCNC: 117 MG/DL
FASTING STATUS PATIENT QL REPORTED: NO
FASTING STATUS PATIENT QL REPORTED: NO
GLUCOSE SERPL-MCNC: 84 MG/DL (ref 70–99)
HBA1C MFR BLD: 5.7 % (ref 0–5.6)
HCO3 SERPL-SCNC: 29 MMOL/L (ref 22–29)
HDLC SERPL-MCNC: 61 MG/DL
LDLC SERPL CALC-MCNC: 81 MG/DL
NONHDLC SERPL-MCNC: 112 MG/DL
POTASSIUM SERPL-SCNC: 3.5 MMOL/L (ref 3.4–5.3)
SODIUM SERPL-SCNC: 141 MMOL/L (ref 135–145)
TRIGL SERPL-MCNC: 155 MG/DL

## 2024-12-18 PROCEDURE — 80048 BASIC METABOLIC PNL TOTAL CA: CPT | Performed by: FAMILY MEDICINE

## 2024-12-18 PROCEDURE — 80061 LIPID PANEL: CPT | Performed by: FAMILY MEDICINE

## 2024-12-18 PROCEDURE — 99214 OFFICE O/P EST MOD 30 MIN: CPT | Performed by: FAMILY MEDICINE

## 2024-12-18 PROCEDURE — 36415 COLL VENOUS BLD VENIPUNCTURE: CPT | Performed by: FAMILY MEDICINE

## 2024-12-18 PROCEDURE — 83036 HEMOGLOBIN GLYCOSYLATED A1C: CPT | Performed by: FAMILY MEDICINE

## 2024-12-18 PROCEDURE — G2211 COMPLEX E/M VISIT ADD ON: HCPCS | Performed by: FAMILY MEDICINE

## 2024-12-18 RX ORDER — ACETAMINOPHEN 500 MG
500-1000 TABLET ORAL EVERY 6 HOURS PRN
Qty: 100 TABLET | Refills: 3 | Status: SHIPPED | OUTPATIENT
Start: 2024-12-18

## 2024-12-18 RX ORDER — GABAPENTIN 100 MG/1
300 CAPSULE ORAL 3 TIMES DAILY
Qty: 90 CAPSULE | Refills: 3 | Status: SHIPPED | OUTPATIENT
Start: 2024-12-18

## 2024-12-18 RX ORDER — LOSARTAN POTASSIUM AND HYDROCHLOROTHIAZIDE 12.5; 5 MG/1; MG/1
1 TABLET ORAL DAILY
Qty: 90 TABLET | Refills: 3 | Status: SHIPPED | OUTPATIENT
Start: 2024-12-18

## 2024-12-18 NOTE — PROGRESS NOTES
"  Assessment & Plan     Chronic bilateral low back pain without sciatica  I suspect a component of spinal stenosis.  Discussed potential imaging and intervention with either injection or surgery.  She does not want to proceed with that right now.  Would like medication based intervention instead.  Has been on gabapentin without much improvement.  We discussed dose increase.  - gabapentin (NEURONTIN) 100 MG capsule  Dispense: 90 capsule; Refill: 3  - Basic metabolic panel  (Ca, Cl, CO2, Creat, Gluc, K, Na, BUN)  - acetaminophen (TYLENOL) 500 MG tablet  Dispense: 100 tablet; Refill: 3    Prediabetes  Improved on recheck today.  Continue lifestyle intervention.  - Hemoglobin A1c    Hyperlipidemia, unspecified hyperlipidemia type  Recheck cholesterol as she is fasting today  - Lipid panel reflex to direct LDL Fasting    Essential hypertension  Well-controlled.  Will continue current medication recheck labs as above.  - losartan-hydrochlorothiazide (HYZAAR) 50-12.5 MG tablet  Dispense: 90 tablet; Refill: 3    The longitudinal plan of care for the diagnosis(es)/condition(s) as documented were addressed during this visit. Due to the added complexity in care, I will continue to support Yudi in the subsequent management and with ongoing continuity of care.     BMI  Estimated body mass index is 33.65 kg/m  as calculated from the following:    Height as of this encounter: 1.473 m (4' 10\").    Weight as of this encounter: 73 kg (161 lb).       Subjective   Yudi is a 84 year old, presenting for the following health issues:  Recheck Medication, Shoulder Pain (1-2 months, comes and goes, both sides), and Leg Problem (Legs feel like they are burning, wear shorts at home because her legs feel like they are burning )      12/18/2024     8:44 AM   Additional Questions   Roomed by Renee     Shoulder Pain    History of Present Illness       Reason for visit:  Follow up medication    She eats 2-3 servings of fruits and vegetables daily.She " "consumes 0 sweetened beverage(s) daily.She exercises with enough effort to increase her heart rate 30 to 60 minutes per day.  She exercises with enough effort to increase her heart rate 4 days per week.   She is taking medications regularly.     She needs to have burning and tingling in lower extremities.  Worse when she is up and moving around.  Will have to stop after exercises which she participates in at the adult day center.  Does seem to start in her back.  Has tried gabapentin.  Has not had much relief with it.  Does feel improved if she can lean forward on something like a shopping cart.    Shoulder troubles as well.  Just tight and difficulty moving around sometimes.  Sometimes difficult to fix her hair with arms above her head.    She would like to have recheck of blood sugars as was prediabetic on last time A1c was checked.  Would also like to have cholesterol checked again today.  Continues rosuvastatin.  No significant muscle pain.        Objective    /76 (BP Location: Left arm, Patient Position: Sitting, Cuff Size: Adult Regular)   Pulse 91   Temp 97.7  F (36.5  C) (Temporal)   Resp 20   Ht 1.473 m (4' 10\")   Wt 73 kg (161 lb)   LMP  (LMP Unknown)   SpO2 97%   BMI 33.65 kg/m    Body mass index is 33.65 kg/m .  Physical Exam   GENERAL: alert, not distressed  CHEST: clear, no rales, rhonchi, or wheezes  CARDIAC: regular without murmur, gallop, or rub  LE: Normal gait.  Negative straight leg raise bilaterally.  Normal strength and sensation in lower extremities.      Prior to immunization administration, verified patients identity using patient s name and date of birth. Please see Immunization Activity for additional information.     Screening Questionnaire for Adult Immunization    Are you sick today?   No   Do you have allergies to medications, food, a vaccine component or latex?   No   Have you ever had a serious reaction after receiving a vaccination?   No   Do you have a long-term " health problem with heart, lung, kidney, or metabolic disease (e.g., diabetes), asthma, a blood disorder, no spleen, complement component deficiency, a cochlear implant, or a spinal fluid leak?  Are you on long-term aspirin therapy?   Don't Know   Do you have cancer, leukemia, HIV/AIDS, or any other immune system problem?   No   Do you have a parent, brother, or sister with an immune system problem?   No   In the past 3 months, have you taken medications that affect  your immune system, such as prednisone, other steroids, or anticancer drugs; drugs for the treatment of rheumatoid arthritis, Crohn s disease, or psoriasis; or have you had radiation treatments?   No   Have you had a seizure, or a brain or other nervous system problem?   No   During the past year, have you received a transfusion of blood or blood    products, or been given immune (gamma) globulin or antiviral drug?   No   For women: Are you pregnant or is there a chance you could become       pregnant during the next month?   No   Have you received any vaccinations in the past 4 weeks?   No     Immunization questionnaire answers were all negative.      Patient instructed to remain in clinic for 15 minutes afterwards, and to report any adverse reactions.     Screening performed by Renee Vargas CMA on 12/18/2024 at 8:55 AM.        Signed Electronically by: Temo Gonzalez MD

## 2025-01-24 ENCOUNTER — PATIENT OUTREACH (OUTPATIENT)
Dept: GERIATRIC MEDICINE | Facility: CLINIC | Age: 85
End: 2025-01-24
Payer: COMMERCIAL

## 2025-01-24 ASSESSMENT — LIFESTYLE VARIABLES
SKIP TO QUESTIONS 9-10: 1
HOW MANY STANDARD DRINKS CONTAINING ALCOHOL DO YOU HAVE ON A TYPICAL DAY: PATIENT DOES NOT DRINK
AUDIT-C TOTAL SCORE: 0
HOW OFTEN DO YOU HAVE SIX OR MORE DRINKS ON ONE OCCASION: NEVER
HOW OFTEN DO YOU HAVE A DRINK CONTAINING ALCOHOL: NEVER

## 2025-02-05 NOTE — PROGRESS NOTES
[facility] Care Coordination Contact    [facility] Home Visit Assessment     Home visit for Health Risk Assessment with Yudi Fragoso completed on January 24, 2025    Type of residence:: Apartment - handicap accessible  Current living arrangement:: I live alone     Assessment completed with:: Patient, Care Team Member    Current Care Plan  Member currently receiving the following home care services:     Member currently receiving the following community resources: Day Care, Transportation Services, Housekeeping/Chore Agency       Medication Review  Medication reconciliation completed in Epic: Yes  Medication set-up & administration: Independent.    .  Self-administers medications.  Medication Risk Assessment Medication (1 or more, place referral to MTM): N/A: No risk factors identified  MTM Referral Placed: No: No risk factors idenified    Mental/Behavioral Health   Depression Screening:   PHQ-2 Total Score (Adult) - Positive if 3 or more points; Administer PHQ-9 if positive: 0       Mental health DX:: No        Falls Assessment:   Fallen 2 or more times in the past year?: No   Any fall with injury in the past year?: No    ADL/IADL Dependencies:   Dependent ADLs:: Eating  Dependent IADLs:: Cooking, Meal Preparation, Money Management, Laundry    Health Plan sponsored benefits: [facility]: Shared information regarding [employer]. Reviewed preventative health screening and health plan supplemental benefits/incentives. Reviewed medication disposal form and transition of care member handout.    CFSS Assessment completed at visit: Not Applicable     Elderly Waiver Eligibility: Yes-will continue on EW    Care Plan & Recommendations: Per Support Plan.    [place].    Follow-Up Plan: Member informed of future contact, plan to f/u with member with a 6 month telephone assessment.  Contact information shared with member and family, encouraged member  to call with any questions or concerns at any time.    [facility] care continuum providers: Please see Snapshot and Care Management Flowsheets for Specific details of care plan.    This CC note routed to PCP, Temo Gonzalez RN  [facility]  [phone]

## 2025-02-24 ENCOUNTER — PATIENT OUTREACH (OUTPATIENT)
Dept: GERIATRIC MEDICINE | Facility: CLINIC | Age: 85
End: 2025-02-24
Payer: COMMERCIAL

## 2025-02-24 NOTE — PROGRESS NOTES
[facility] Care Coordination Contact    Received after visit chart from care coordinator.  Completed following tasks: Mailed copy of support plan to member, Mailed [place] Cho[place] signature sheet pages 3-4, Mailed Safe Medication Disposal , Mailed Transition of Care Member Handout, Submitted referrals/auths for ADC with transportation, and Updated services in Database.   and Provider Signature - No Support Plan Shared:  Member indicates that they do not want their support plan shared with any EW providers.    Loni Slaughter  Care Management Specialist  [facility]  [phone]

## 2025-02-24 NOTE — LETTER
February 24, 2025       YUDI MEJIA  [address]     [place] [address]      Dear Yudi,    At [facility], we re dedicated to improving your health and wellness. Enclosed is the Support Plan developed with you on 1/24/2025. Please review the Support Plan carefully.    As a reminder, during your visit we talked about:   Ways to manage your physical and mental health   Using health care to maintain and improve your health    Your preventive care needs      Remember to contact your care coordinator if you:   Are hospitalized or plan to be hospitalized    Have a fall     Have a change in your physical or mental health   Need help finding support or services    If you have questions or don t agree with your Support Plan, call me at [phone]. You can also call me if your needs change. TTY users call the [place] Relay at 429 or 1-180.393.5581 (zsfmgy-fq-ojyynr relay service).    Sincerely,       Bisi Perdomo RN  [phone]  [email or web address]                [id number] accepted     (06/2024)                [address] [place] [address]  [phone]  fax [phone]  [facility]

## 2025-04-29 DIAGNOSIS — E78.5 HYPERLIPIDEMIA, UNSPECIFIED HYPERLIPIDEMIA TYPE: ICD-10-CM

## 2025-04-29 RX ORDER — ROSUVASTATIN CALCIUM 20 MG/1
TABLET, COATED ORAL
Qty: 90 TABLET | Refills: 2 | Status: SHIPPED | OUTPATIENT
Start: 2025-04-29

## 2025-08-05 ENCOUNTER — APPOINTMENT (OUTPATIENT)
Dept: INTERPRETER SERVICES | Facility: CLINIC | Age: 85
End: 2025-08-05
Payer: COMMERCIAL

## 2025-08-05 ENCOUNTER — PATIENT OUTREACH (OUTPATIENT)
Dept: FAMILY MEDICINE | Facility: CLINIC | Age: 85
End: 2025-08-05
Payer: COMMERCIAL

## 2025-08-13 ENCOUNTER — VIRTUAL VISIT (OUTPATIENT)
Dept: INTERPRETER SERVICES | Facility: CLINIC | Age: 85
End: 2025-08-13

## 2025-08-13 ENCOUNTER — PATIENT OUTREACH (OUTPATIENT)
Dept: GERIATRIC MEDICINE | Facility: CLINIC | Age: 85
End: 2025-08-13

## 2025-08-13 ENCOUNTER — HOSPITAL ENCOUNTER (EMERGENCY)
Facility: CLINIC | Age: 85
Discharge: HOME | End: 2025-08-13
Admitting: EMERGENCY MEDICINE
Payer: COMMERCIAL

## 2025-08-13 VITALS
HEART RATE: 85 BPM | BODY MASS INDEX: 31.61 KG/M2 | OXYGEN SATURATION: 96 % | SYSTOLIC BLOOD PRESSURE: 140 MMHG | HEIGHT: 60 IN | DIASTOLIC BLOOD PRESSURE: 73 MMHG | TEMPERATURE: 97.3 F | RESPIRATION RATE: 18 BRPM | WEIGHT: 161 LBS

## 2025-08-13 DIAGNOSIS — K57.92 DIVERTICULITIS: Primary | ICD-10-CM

## 2025-08-13 LAB
ALBUMIN SERPL BCG-MCNC: 4.3 G/DL (ref 3.5–5.2)
ALBUMIN UR-MCNC: NEGATIVE MG/DL
ALP SERPL-CCNC: 93 U/L (ref 40–150)
ALT SERPL W P-5'-P-CCNC: 17 U/L (ref 0–50)
ANION GAP SERPL CALCULATED.3IONS-SCNC: 10 MMOL/L (ref 7–15)
APPEARANCE UR: CLEAR
AST SERPL W P-5'-P-CCNC: 25 U/L (ref 0–45)
BASOPHILS # BLD AUTO: 0.06 10E3/UL (ref 0–0.2)
BASOPHILS NFR BLD AUTO: 0.5 %
BILIRUB SERPL-MCNC: 0.5 MG/DL
BILIRUB UR QL STRIP: NEGATIVE
BUN SERPL-MCNC: 17.6 MG/DL (ref 8–23)
CALCIUM SERPL-MCNC: 10 MG/DL (ref 8.8–10.4)
CHLORIDE SERPL-SCNC: 104 MMOL/L (ref 98–107)
COLOR UR AUTO: COLORLESS
CREAT SERPL-MCNC: 0.77 MG/DL (ref 0.51–0.95)
EGFRCR SERPLBLD CKD-EPI 2021: 76 ML/MIN/1.73M2
EOSINOPHIL # BLD AUTO: 0.05 10E3/UL (ref 0–0.7)
EOSINOPHIL NFR BLD AUTO: 0.5 %
ERYTHROCYTE [DISTWIDTH] IN BLOOD BY AUTOMATED COUNT: 13 % (ref 10–15)
GLUCOSE SERPL-MCNC: 93 MG/DL (ref 70–99)
GLUCOSE UR STRIP-MCNC: NEGATIVE MG/DL
HCO3 SERPL-SCNC: 25 MMOL/L (ref 22–29)
HCT VFR BLD AUTO: 43.8 % (ref 35–47)
HGB BLD-MCNC: 14.9 G/DL (ref 11.7–15.7)
HGB UR QL STRIP: ABNORMAL
IMM GRANULOCYTES # BLD: 0.03 10E3/UL
IMM GRANULOCYTES NFR BLD: 0.3 %
KETONES UR STRIP-MCNC: NEGATIVE MG/DL
LEUKOCYTE ESTERASE UR QL STRIP: NEGATIVE
LIPASE SERPL-CCNC: 21 U/L (ref 13–60)
LYMPHOCYTES # BLD AUTO: 1.81 10E3/UL (ref 0.8–5.3)
LYMPHOCYTES NFR BLD AUTO: 16.3 %
MCH RBC QN AUTO: 30 PG (ref 26.5–33)
MCHC RBC AUTO-ENTMCNC: 34 G/DL (ref 31.5–36.5)
MCV RBC AUTO: 88.3 FL (ref 78–100)
MONOCYTES # BLD AUTO: 0.9 10E3/UL (ref 0–1.3)
MONOCYTES NFR BLD AUTO: 8.1 %
MUCOUS THREADS #/AREA URNS LPF: PRESENT /LPF
NEUTROPHILS # BLD AUTO: 8.25 10E3/UL (ref 1.6–8.3)
NEUTROPHILS NFR BLD AUTO: 74.3 %
NITRATE UR QL: NEGATIVE
NRBC # BLD AUTO: 0 10E3/UL
NRBC BLD AUTO-RTO: 0 /100
PH UR STRIP: 6.5 [PH] (ref 5–7)
PLATELET # BLD AUTO: 176 10E3/UL (ref 150–450)
POTASSIUM SERPL-SCNC: 3.6 MMOL/L (ref 3.4–5.3)
PROT SERPL-MCNC: 6.9 G/DL (ref 6.4–8.3)
RBC # BLD AUTO: 4.96 10E6/UL (ref 3.8–5.2)
RBC URINE: <1 /HPF
SODIUM SERPL-SCNC: 139 MMOL/L (ref 135–145)
SP GR UR STRIP: 1.01 (ref 1–1.03)
SQUAMOUS EPITHELIAL: <1 /HPF
UROBILINOGEN UR STRIP-MCNC: NORMAL MG/DL
WBC # BLD AUTO: 11.1 10E3/UL (ref 4–11)
WBC URINE: 1 /HPF

## 2025-08-13 PROCEDURE — 85014 HEMATOCRIT: CPT | Performed by: EMERGENCY MEDICINE

## 2025-08-13 PROCEDURE — 99285 EMERGENCY DEPT VISIT HI MDM: CPT | Mod: 25

## 2025-08-13 PROCEDURE — 83690 ASSAY OF LIPASE: CPT | Performed by: EMERGENCY MEDICINE

## 2025-08-13 PROCEDURE — 96374 THER/PROPH/DIAG INJ IV PUSH: CPT | Mod: 59

## 2025-08-13 PROCEDURE — 250N000011 HC RX IP 250 OP 636: Performed by: EMERGENCY MEDICINE

## 2025-08-13 PROCEDURE — 81001 URINALYSIS AUTO W/SCOPE: CPT | Performed by: EMERGENCY MEDICINE

## 2025-08-13 PROCEDURE — T1013 SIGN LANG/ORAL INTERPRETER: HCPCS | Mod: GT,TEL,95

## 2025-08-13 PROCEDURE — 80053 COMPREHEN METABOLIC PANEL: CPT | Performed by: EMERGENCY MEDICINE

## 2025-08-13 PROCEDURE — 36415 COLL VENOUS BLD VENIPUNCTURE: CPT | Performed by: EMERGENCY MEDICINE

## 2025-08-13 RX ORDER — IOPAMIDOL 755 MG/ML
90 INJECTION, SOLUTION INTRAVASCULAR ONCE
Status: COMPLETED | OUTPATIENT
Start: 2025-08-13 | End: 2025-08-13

## 2025-08-13 RX ORDER — KETOROLAC TROMETHAMINE 15 MG/ML
15 INJECTION, SOLUTION INTRAMUSCULAR; INTRAVENOUS ONCE
Status: COMPLETED | OUTPATIENT
Start: 2025-08-13 | End: 2025-08-13

## 2025-08-13 RX ADMIN — IOPAMIDOL 90 ML: 755 INJECTION, SOLUTION INTRAVENOUS at 10:58

## 2025-08-13 RX ADMIN — KETOROLAC TROMETHAMINE 15 MG: 15 INJECTION, SOLUTION INTRAMUSCULAR; INTRAVENOUS at 11:24

## 2025-08-13 ASSESSMENT — ENCOUNTER SYMPTOMS
CONSTIPATION: 0
CHILLS: 0
VOMITING: 0
HEMATURIA: 0
NAUSEA: 0
DIARRHEA: 0
ABDOMINAL PAIN: 1
DYSURIA: 0
FEVER: 0
SHORTNESS OF BREATH: 0

## 2025-08-13 ASSESSMENT — COLUMBIA-SUICIDE SEVERITY RATING SCALE - C-SSRS
2. HAVE YOU ACTUALLY HAD ANY THOUGHTS OF KILLING YOURSELF IN THE PAST MONTH?: NO
6. HAVE YOU EVER DONE ANYTHING, STARTED TO DO ANYTHING, OR PREPARED TO DO ANYTHING TO END YOUR LIFE?: NO
1. IN THE PAST MONTH, HAVE YOU WISHED YOU WERE DEAD OR WISHED YOU COULD GO TO SLEEP AND NOT WAKE UP?: NO

## 2025-08-27 ENCOUNTER — OFFICE VISIT (OUTPATIENT)
Dept: FAMILY MEDICINE | Facility: CLINIC | Age: 85
End: 2025-08-27
Payer: COMMERCIAL

## 2025-08-27 VITALS
WEIGHT: 158 LBS | RESPIRATION RATE: 16 BRPM | OXYGEN SATURATION: 97 % | SYSTOLIC BLOOD PRESSURE: 132 MMHG | DIASTOLIC BLOOD PRESSURE: 72 MMHG | BODY MASS INDEX: 33.17 KG/M2 | HEIGHT: 58 IN | TEMPERATURE: 98 F | HEART RATE: 53 BPM

## 2025-08-27 DIAGNOSIS — K57.32 DIVERTICULITIS OF COLON: Primary | ICD-10-CM

## 2025-08-27 DIAGNOSIS — I10 ESSENTIAL HYPERTENSION: ICD-10-CM

## 2025-08-27 PROCEDURE — 90480 ADMN SARSCOV2 VAC 1/ONLY CMP: CPT | Performed by: PHYSICIAN ASSISTANT

## 2025-08-27 PROCEDURE — 91320 SARSCV2 VAC 30MCG TRS-SUC IM: CPT | Performed by: PHYSICIAN ASSISTANT

## 2025-08-27 PROCEDURE — 99214 OFFICE O/P EST MOD 30 MIN: CPT | Mod: 25 | Performed by: PHYSICIAN ASSISTANT

## 2025-08-27 PROCEDURE — 3078F DIAST BP <80 MM HG: CPT | Performed by: PHYSICIAN ASSISTANT

## 2025-08-27 PROCEDURE — 3075F SYST BP GE 130 - 139MM HG: CPT | Performed by: PHYSICIAN ASSISTANT
